# Patient Record
Sex: MALE | Race: WHITE | NOT HISPANIC OR LATINO | Employment: FULL TIME | URBAN - METROPOLITAN AREA
[De-identification: names, ages, dates, MRNs, and addresses within clinical notes are randomized per-mention and may not be internally consistent; named-entity substitution may affect disease eponyms.]

---

## 2023-08-23 PROBLEM — D68.51 FACTOR V LEIDEN (MULTI): Status: ACTIVE | Noted: 2023-08-23

## 2023-08-23 PROBLEM — I10 ESSENTIAL HYPERTENSION: Status: ACTIVE | Noted: 2023-08-23

## 2023-08-23 PROBLEM — K21.9 GASTROESOPHAGEAL REFLUX DISEASE WITHOUT ESOPHAGITIS: Status: ACTIVE | Noted: 2023-08-23

## 2023-08-23 PROBLEM — E78.2 MIXED HYPERLIPIDEMIA: Status: ACTIVE | Noted: 2023-08-23

## 2023-08-23 PROBLEM — R73.03 PREDIABETES: Status: ACTIVE | Noted: 2023-08-23

## 2023-08-23 PROBLEM — M50.90 CERVICAL DISC DISEASE: Status: ACTIVE | Noted: 2023-08-23

## 2023-08-23 RX ORDER — FLUTICASONE PROPIONATE 50 MCG
1 SPRAY, SUSPENSION (ML) NASAL DAILY PRN
COMMUNITY
Start: 2014-10-30 | End: 2023-10-13 | Stop reason: SDUPTHER

## 2023-08-23 RX ORDER — WARFARIN SODIUM 5 MG/1
TABLET ORAL
COMMUNITY
Start: 2014-11-28 | End: 2023-10-13 | Stop reason: SDUPTHER

## 2023-08-23 RX ORDER — PSEUDOEPHEDRINE HCL 30 MG
2 TABLET ORAL EVERY 6 HOURS PRN
COMMUNITY
End: 2023-10-13 | Stop reason: SDUPTHER

## 2023-08-23 RX ORDER — ROSUVASTATIN CALCIUM 5 MG/1
1 TABLET, COATED ORAL DAILY
COMMUNITY
Start: 2022-08-29 | End: 2023-10-09

## 2023-08-23 RX ORDER — SUMATRIPTAN 50 MG/1
1 TABLET, FILM COATED ORAL 2 TIMES DAILY PRN
COMMUNITY
Start: 2014-01-02 | End: 2023-10-13 | Stop reason: SDUPTHER

## 2023-10-07 ENCOUNTER — LAB (OUTPATIENT)
Dept: LAB | Facility: LAB | Age: 63
End: 2023-10-07
Payer: COMMERCIAL

## 2023-10-07 DIAGNOSIS — E78.2 MIXED HYPERLIPIDEMIA: ICD-10-CM

## 2023-10-07 DIAGNOSIS — Z12.5 ENCOUNTER FOR SCREENING FOR MALIGNANT NEOPLASM OF PROSTATE: ICD-10-CM

## 2023-10-07 DIAGNOSIS — R73.03 PREDIABETES: ICD-10-CM

## 2023-10-07 DIAGNOSIS — E55.9 VITAMIN D DEFICIENCY, UNSPECIFIED: ICD-10-CM

## 2023-10-07 DIAGNOSIS — Z01.89 ENCOUNTER FOR OTHER SPECIFIED SPECIAL EXAMINATIONS: Primary | ICD-10-CM

## 2023-10-07 LAB
25(OH)D3 SERPL-MCNC: 30 NG/ML (ref 31–100)
ALBUMIN SERPL-MCNC: 4.1 G/DL (ref 3.5–5)
ALP BLD-CCNC: 64 U/L (ref 35–125)
ALT SERPL-CCNC: 22 U/L (ref 5–40)
ANION GAP SERPL CALC-SCNC: 11 MMOL/L
AST SERPL-CCNC: 18 U/L (ref 5–40)
BASOPHILS # BLD AUTO: 0.05 X10*3/UL (ref 0–0.1)
BASOPHILS NFR BLD AUTO: 0.6 %
BILIRUB DIRECT SERPL-MCNC: <0.2 MG/DL (ref 0–0.2)
BILIRUB SERPL-MCNC: 0.7 MG/DL (ref 0.1–1.2)
BUN SERPL-MCNC: 22 MG/DL (ref 8–25)
CALCIUM SERPL-MCNC: 9.3 MG/DL (ref 8.5–10.4)
CHLORIDE SERPL-SCNC: 106 MMOL/L (ref 97–107)
CHOLEST SERPL-MCNC: 134 MG/DL (ref 133–200)
CHOLEST/HDLC SERPL: 2.6 {RATIO}
CO2 SERPL-SCNC: 28 MMOL/L (ref 24–31)
CREAT SERPL-MCNC: 1.2 MG/DL (ref 0.4–1.6)
EOSINOPHIL # BLD AUTO: 0.25 X10*3/UL (ref 0–0.7)
EOSINOPHIL NFR BLD AUTO: 3.1 %
ERYTHROCYTE [DISTWIDTH] IN BLOOD BY AUTOMATED COUNT: 12.7 % (ref 11.5–14.5)
EST. AVERAGE GLUCOSE BLD GHB EST-MCNC: 128 MG/DL
GFR SERPL CREATININE-BSD FRML MDRD: 67 ML/MIN/1.73M*2
GLUCOSE SERPL-MCNC: 123 MG/DL (ref 65–99)
HBA1C MFR BLD: 6.1 %
HCT VFR BLD AUTO: 50.7 % (ref 41–52)
HDLC SERPL-MCNC: 51 MG/DL
HGB BLD-MCNC: 16.8 G/DL (ref 13.5–17.5)
IMM GRANULOCYTES # BLD AUTO: 0.02 X10*3/UL (ref 0–0.7)
IMM GRANULOCYTES NFR BLD AUTO: 0.3 % (ref 0–0.9)
LDLC SERPL CALC-MCNC: 62 MG/DL (ref 65–130)
LYMPHOCYTES # BLD AUTO: 1.34 X10*3/UL (ref 1.2–4.8)
LYMPHOCYTES NFR BLD AUTO: 16.8 %
MCH RBC QN AUTO: 30.1 PG (ref 26–34)
MCHC RBC AUTO-ENTMCNC: 33.1 G/DL (ref 32–36)
MCV RBC AUTO: 91 FL (ref 80–100)
MONOCYTES # BLD AUTO: 0.69 X10*3/UL (ref 0.1–1)
MONOCYTES NFR BLD AUTO: 8.6 %
NEUTROPHILS # BLD AUTO: 5.63 X10*3/UL (ref 1.2–7.7)
NEUTROPHILS NFR BLD AUTO: 70.6 %
NRBC BLD-RTO: 0 /100 WBCS (ref 0–0)
PLATELET # BLD AUTO: 205 X10*3/UL (ref 150–450)
PMV BLD AUTO: 10.6 FL (ref 7.5–11.5)
POTASSIUM SERPL-SCNC: 5.5 MMOL/L (ref 3.4–5.1)
PROT SERPL-MCNC: 6.8 G/DL (ref 5.9–7.9)
PSA SERPL-MCNC: 1.9 NG/ML
RBC # BLD AUTO: 5.59 X10*6/UL (ref 4.5–5.9)
SODIUM SERPL-SCNC: 145 MMOL/L (ref 133–145)
TRIGL SERPL-MCNC: 105 MG/DL (ref 40–150)
WBC # BLD AUTO: 8 X10*3/UL (ref 4.4–11.3)

## 2023-10-07 PROCEDURE — 82306 VITAMIN D 25 HYDROXY: CPT

## 2023-10-07 PROCEDURE — 36415 COLL VENOUS BLD VENIPUNCTURE: CPT

## 2023-10-07 PROCEDURE — 80053 COMPREHEN METABOLIC PANEL: CPT

## 2023-10-07 PROCEDURE — 84153 ASSAY OF PSA TOTAL: CPT

## 2023-10-07 PROCEDURE — 82248 BILIRUBIN DIRECT: CPT

## 2023-10-07 PROCEDURE — 85025 COMPLETE CBC W/AUTO DIFF WBC: CPT

## 2023-10-07 PROCEDURE — 80061 LIPID PANEL: CPT

## 2023-10-07 PROCEDURE — 83036 HEMOGLOBIN GLYCOSYLATED A1C: CPT

## 2023-10-09 DIAGNOSIS — E78.2 MIXED HYPERLIPIDEMIA: ICD-10-CM

## 2023-10-09 DIAGNOSIS — D68.51 FACTOR V LEIDEN (MULTI): Primary | ICD-10-CM

## 2023-10-09 RX ORDER — ROSUVASTATIN CALCIUM 5 MG/1
5 TABLET, COATED ORAL DAILY
Qty: 90 TABLET | Refills: 3 | Status: SHIPPED | OUTPATIENT
Start: 2023-10-09 | End: 2023-10-13 | Stop reason: SDUPTHER

## 2023-10-11 DIAGNOSIS — E87.5 HYPERKALEMIA: Primary | ICD-10-CM

## 2023-10-13 ENCOUNTER — OFFICE VISIT (OUTPATIENT)
Dept: PRIMARY CARE | Facility: CLINIC | Age: 63
End: 2023-10-13
Payer: COMMERCIAL

## 2023-10-13 VITALS
WEIGHT: 208 LBS | HEART RATE: 77 BPM | TEMPERATURE: 98 F | DIASTOLIC BLOOD PRESSURE: 80 MMHG | OXYGEN SATURATION: 95 % | BODY MASS INDEX: 29.01 KG/M2 | SYSTOLIC BLOOD PRESSURE: 138 MMHG

## 2023-10-13 DIAGNOSIS — D68.51 FACTOR V LEIDEN (MULTI): ICD-10-CM

## 2023-10-13 DIAGNOSIS — I10 ESSENTIAL HYPERTENSION: Primary | ICD-10-CM

## 2023-10-13 DIAGNOSIS — Z12.5 ENCOUNTER FOR PROSTATE CANCER SCREENING: ICD-10-CM

## 2023-10-13 DIAGNOSIS — E78.2 MIXED HYPERLIPIDEMIA: ICD-10-CM

## 2023-10-13 DIAGNOSIS — J30.2 SEASONAL ALLERGIES: ICD-10-CM

## 2023-10-13 DIAGNOSIS — R73.03 PREDIABETES: ICD-10-CM

## 2023-10-13 DIAGNOSIS — G43.909 MIGRAINE SYNDROME: ICD-10-CM

## 2023-10-13 DIAGNOSIS — Z23 ENCOUNTER FOR IMMUNIZATION: ICD-10-CM

## 2023-10-13 DIAGNOSIS — Z79.01 LONG TERM CURRENT USE OF ANTICOAGULANT: ICD-10-CM

## 2023-10-13 DIAGNOSIS — E55.9 VITAMIN D DEFICIENCY: ICD-10-CM

## 2023-10-13 DIAGNOSIS — E87.5 HYPERKALEMIA: ICD-10-CM

## 2023-10-13 DIAGNOSIS — Z01.89 ENCOUNTER FOR ROUTINE LABORATORY TESTING: ICD-10-CM

## 2023-10-13 PROCEDURE — 1159F MED LIST DOCD IN RCRD: CPT | Performed by: STUDENT IN AN ORGANIZED HEALTH CARE EDUCATION/TRAINING PROGRAM

## 2023-10-13 PROCEDURE — 1126F AMNT PAIN NOTED NONE PRSNT: CPT | Performed by: STUDENT IN AN ORGANIZED HEALTH CARE EDUCATION/TRAINING PROGRAM

## 2023-10-13 PROCEDURE — 1160F RVW MEDS BY RX/DR IN RCRD: CPT | Performed by: STUDENT IN AN ORGANIZED HEALTH CARE EDUCATION/TRAINING PROGRAM

## 2023-10-13 PROCEDURE — 3075F SYST BP GE 130 - 139MM HG: CPT | Performed by: STUDENT IN AN ORGANIZED HEALTH CARE EDUCATION/TRAINING PROGRAM

## 2023-10-13 PROCEDURE — 99214 OFFICE O/P EST MOD 30 MIN: CPT | Performed by: STUDENT IN AN ORGANIZED HEALTH CARE EDUCATION/TRAINING PROGRAM

## 2023-10-13 PROCEDURE — 3079F DIAST BP 80-89 MM HG: CPT | Performed by: STUDENT IN AN ORGANIZED HEALTH CARE EDUCATION/TRAINING PROGRAM

## 2023-10-13 PROCEDURE — 1036F TOBACCO NON-USER: CPT | Performed by: STUDENT IN AN ORGANIZED HEALTH CARE EDUCATION/TRAINING PROGRAM

## 2023-10-13 PROCEDURE — 90662 IIV NO PRSV INCREASED AG IM: CPT | Performed by: STUDENT IN AN ORGANIZED HEALTH CARE EDUCATION/TRAINING PROGRAM

## 2023-10-13 RX ORDER — SUMATRIPTAN 50 MG/1
50 TABLET, FILM COATED ORAL 2 TIMES DAILY PRN
Qty: 9 TABLET | Refills: 1 | Status: SHIPPED | OUTPATIENT
Start: 2023-10-13 | End: 2023-12-12 | Stop reason: SDUPTHER

## 2023-10-13 RX ORDER — FLUTICASONE PROPIONATE 50 MCG
1 SPRAY, SUSPENSION (ML) NASAL DAILY PRN
Qty: 16 G
Start: 2023-10-13 | End: 2024-04-12 | Stop reason: ALTCHOICE

## 2023-10-13 RX ORDER — WARFARIN SODIUM 5 MG/1
TABLET ORAL
Start: 2023-10-13 | End: 2024-04-12 | Stop reason: SDUPTHER

## 2023-10-13 RX ORDER — ROSUVASTATIN CALCIUM 5 MG/1
5 TABLET, COATED ORAL DAILY
Qty: 90 TABLET | Refills: 3 | Status: SHIPPED | OUTPATIENT
Start: 2023-10-13 | End: 2023-12-05

## 2023-10-13 RX ORDER — MINERAL OIL
180 ENEMA (ML) RECTAL DAILY
Qty: 90 TABLET | Refills: 1 | Status: SHIPPED | OUTPATIENT
Start: 2023-10-13 | End: 2024-04-12 | Stop reason: SDUPTHER

## 2023-10-13 RX ORDER — SUMATRIPTAN 50 MG/1
50 TABLET, FILM COATED ORAL 2 TIMES DAILY PRN
Qty: 9 TABLET
Start: 2023-10-13 | End: 2023-10-13 | Stop reason: SDUPTHER

## 2023-10-13 RX ORDER — PSEUDOEPHEDRINE HCL 30 MG
60 TABLET ORAL EVERY 6 HOURS PRN
Qty: 30 TABLET
Start: 2023-10-13

## 2023-10-13 RX ORDER — VIT C/E/ZN/COPPR/LUTEIN/ZEAXAN 250MG-90MG
25 CAPSULE ORAL DAILY
Qty: 30 CAPSULE | Refills: 11
Start: 2023-10-13 | End: 2024-04-12 | Stop reason: SDUPTHER

## 2023-10-13 ASSESSMENT — ENCOUNTER SYMPTOMS
RESPIRATORY NEGATIVE: 1
CARDIOVASCULAR NEGATIVE: 1
CONSTITUTIONAL NEGATIVE: 1
GASTROINTESTINAL NEGATIVE: 1

## 2023-10-13 ASSESSMENT — PAIN SCALES - GENERAL: PAINLEVEL: 0-NO PAIN

## 2023-10-13 NOTE — PATIENT INSTRUCTIONS
Patient to double-check insurance formulation for his propranolol since I got a notification that this medication might not be preferred on his insurance.  Diagnoses and all orders for this visit:  Essential hypertension  Comments:  Acceptable. No changes at this time.  Orders:  -     propranolol XL (Innopran XL) 120 mg 24 hr capsule; Take 1 capsule (120 mg) by mouth once daily.  -     Basic Metabolic Panel; Future  -     CBC; Future  Prediabetes  Comments:  Stable. No changes at this time.  Orders:  -     Hemoglobin A1C; Future  Mixed hyperlipidemia  Comments:  Stable. No changes at this time.  Orders:  -     rosuvastatin (Crestor) 5 mg tablet; Take 1 tablet (5 mg) by mouth once daily.  -     Hepatic Function Panel; Future  -     Lipid Panel; Future  Factor V Leiden (CMS/HCC)  -     warfarin (Jantoven) 5 mg tablet; 1 tablet one day of the week, 0.5 tablet six days a week  Long term current use of anticoagulant  -     warfarin (Jantoven) 5 mg tablet; 1 tablet one day of the week, 0.5 tablet six days a week  Vitamin D deficiency  -     cholecalciferol (Vitamin D3) 25 MCG (1000 UT) capsule; Take 1 capsule (25 mcg) by mouth once daily.  -     Vitamin D 25-Hydroxy,Total (for eval of Vitamin D levels); Future  Migraine syndrome  -     SUMAtriptan (Imitrex) 50 mg tablet; Take 1 tablet (50 mg) by mouth 2 times a day as needed for migraine.  Seasonal allergies  Comments:  Claritin and Zyrtec alongside flonase not providing sufficient relief.  Trial allegra. Sent today.  Orders:  -     fluticasone (Flonase Allergy Relief) 50 mcg/actuation nasal spray; Administer 1 spray into each nostril once daily as needed for rhinitis or allergies.  -     pseudoephedrine (Sudafed) 30 mg tablet; Take 2 tablets (60 mg) by mouth every 6 hours if needed for congestion.  -     fexofenadine (Allegra) 180 mg tablet; Take 1 tablet (180 mg) by mouth once daily.  Encounter for immunization  -     Flu vaccine, quadrivalent, high-dose, preservative  free, age 65y+ (FLUZONE)  Hyperkalemia  Comments:  BMP ordered for next week.  Encounter for routine laboratory testing  -     Basic Metabolic Panel; Future  -     Prostate Specific Antigen; Future  -     Hepatic Function Panel; Future  -     Lipid Panel; Future  -     CBC; Future  -     Hemoglobin A1C; Future  -     Vitamin D 25-Hydroxy,Total (for eval of Vitamin D levels); Future  Encounter for prostate cancer screening  -     Prostate Specific Antigen; Future

## 2023-10-13 NOTE — PROGRESS NOTES
[facility]: [facility]  PROGRESS NOTE      Kaiden Velasquez is a 63 y.o. male that is presenting today for Follow-up (6 month follow up ).    Assessment/Plan   Diagnoses and all orders for this visit:  Essential hypertension  Comments:  Acceptable. No changes at this time.  Orders:  -     propranolol XL (Innopran XL) 120 mg 24 hr capsule; Take 1 capsule (120 mg) by mouth once daily.  -     Basic Metabolic Panel; Future  -     CBC; Future  Prediabetes  Comments:  Stable. No changes at this time.  Orders:  -     Hemoglobin A1C; Future  Mixed hyperlipidemia  Comments:  Stable. No changes at this time.  Orders:  -     rosuvastatin (Crestor) 5 mg tablet; Take 1 tablet (5 mg) by mouth once daily.  -     Hepatic Function Panel; Future  -     Lipid Panel; Future  Factor V Leiden (CMS/HCC)  -     warfarin (Jantoven) 5 mg tablet; 1 tablet one day of the week, 0.5 tablet six days a week  Long term current use of anticoagulant  -     warfarin (Jantoven) 5 mg tablet; 1 tablet one day of the week, 0.5 tablet six days a week  Vitamin D deficiency  -     cholecalciferol (Vitamin D3) 25 MCG (1000 UT) capsule; Take 1 capsule (25 mcg) by mouth once daily.  -     Vitamin D 25-Hydroxy,Total (for eval of Vitamin D levels); Future  Migraine syndrome  -     SUMAtriptan (Imitrex) 50 mg tablet; Take 1 tablet (50 mg) by mouth 2 times a day as needed for migraine.  Seasonal allergies  Comments:  Claritin and Zyrtec alongside flonase not providing sufficient relief.  Trial allegra. Sent today.  Orders:  -     fluticasone (Flonase Allergy Relief) 50 mcg/actuation nasal spray; Administer 1 spray into each nostril once daily as needed for rhinitis or allergies.  -     pseudoephedrine (Sudafed) 30 mg tablet; Take 2 tablets (60 mg) by mouth every 6 hours if needed for congestion.  -     fexofenadine (Allegra) 180 mg tablet; Take 1 tablet (180 mg) by mouth once daily.  Encounter for immunization  -     Flu vaccine,  quadrivalent, high-dose, preservative free, age 65y+ (FLUZONE)  Hyperkalemia  Comments:  BMP ordered for next week.  Encounter for routine laboratory testing  -     Basic Metabolic Panel; Future  -     Prostate Specific Antigen; Future  -     Hepatic Function Panel; Future  -     Lipid Panel; Future  -     CBC; Future  -     Hemoglobin A1C; Future  -     Vitamin D 25-Hydroxy,Total (for eval of Vitamin D levels); Future  Encounter for prostate cancer screening  -     Prostate Specific Antigen; Future    Subjective   - The patient feels well and denies any acute symptoms or concerns at this time.   - The patient denies any changes or progression of their chronic medical problems.  - The patient denies any problems or concerns with their medications.      Review of Systems   Constitutional: Negative.    Respiratory: Negative.     Cardiovascular: Negative.    Gastrointestinal: Negative.       Objective   Vitals:    10/13/23 1435   BP: 138/80   Pulse: 77   Temp: 36.7 °C (98 °F)   SpO2: 95%      Body mass index is 29.01 kg/m².  Physical Exam  Constitutional:       General: He is not in acute distress.  Neck:      Vascular: No carotid bruit.   Cardiovascular:      Rate and Rhythm: Normal rate and regular rhythm.      Heart sounds: Normal heart sounds.   Pulmonary:      Effort: Pulmonary effort is normal.      Breath sounds: Normal breath sounds.   Musculoskeletal:         General: No swelling.   Neurological:      Mental Status: He is alert. Mental status is at baseline.   Psychiatric:         Mood and Affect: Mood normal.       Diagnostic Results   Lab Results   Component Value Date    GLUCOSE 123 (H) 10/07/2023    CALCIUM 9.3 10/07/2023     10/07/2023    K 5.5 (H) 10/07/2023    CO2 28 10/07/2023     10/07/2023    BUN 22 10/07/2023    CREATININE 1.20 10/07/2023     Lab Results   Component Value Date    ALT 22 10/07/2023    AST 18 10/07/2023    ALKPHOS 64 10/07/2023    BILITOT 0.7 10/07/2023     Lab Results  "  Component Value Date    WBC 8.0 10/07/2023    HGB 16.8 10/07/2023    HCT 50.7 10/07/2023    MCV 91 10/07/2023     10/07/2023     Lab Results   Component Value Date    CHOL 134 10/07/2023    CHOL 189 08/26/2022    CHOL 183 04/02/2022     Lab Results   Component Value Date    HDL 51.0 10/07/2023    HDL 44 08/26/2022    HDL 45 04/02/2022     Lab Results   Component Value Date    LDLCALC 62 (L) 10/07/2023    LDLCALC 118 08/26/2022    LDLCALC 119 04/02/2022     Lab Results   Component Value Date    TRIG 105 10/07/2023    TRIG 135 08/26/2022    TRIG 97 04/02/2022     No components found for: \"CHOLHDL\"  Lab Results   Component Value Date    HGBA1C 6.1 (H) 10/07/2023     Other labs not included in the list above were reviewed either before or during this encounter.    History    No past medical history on file.  No past surgical history on file.  Family History   Problem Relation Name Age of Onset    Arthritis Mother      Gout Mother      Diabetes Mother      Heart disease Father      Hypertension Father      Prostate cancer Father      Breast cancer Sister      Cancer Brother      No Known Problems Maternal Grandfather      Colon cancer Other      Hypertension Other       Social History     Socioeconomic History    Marital status:      Spouse name: Not on file    Number of children: Not on file    Years of education: Not on file    Highest education level: Not on file   Occupational History    Not on file   Tobacco Use    Smoking status: Never    Smokeless tobacco: Never   Substance and Sexual Activity    Alcohol use: Yes     Comment: occasionally    Drug use: Never    Sexual activity: Not on file   Other Topics Concern    Not on file   Social History Narrative    Not on file     Social Determinants of Health     Financial Resource Strain: Not on file   Food Insecurity: Not on file   Transportation Needs: Not on file   Physical Activity: Not on file   Stress: Not on file   Social Connections: Not on file "   Intimate Partner Violence: Not on file   Housing Stability: Not on file     No Known Allergies  Current Outpatient Medications on File Prior to Visit   Medication Sig Dispense Refill    [DISCONTINUED] fluticasone (Flonase Allergy Relief) 50 mcg/actuation nasal spray Administer 1 spray into each nostril once daily as needed.      [DISCONTINUED] propranolol XL (Innopran XL) 120 mg 24 hr capsule Take 1 capsule (120 mg) by mouth once daily.      [DISCONTINUED] pseudoephedrine (Sudafed) 30 mg tablet Take 2 tablets (60 mg) by mouth every 6 hours if needed.      [DISCONTINUED] rosuvastatin (Crestor) 5 mg tablet TAKE ONE TABLET BY MOUTH EVERY DAY 90 tablet 3    [DISCONTINUED] SUMAtriptan (Imitrex) 50 mg tablet Take 1 tablet (50 mg) by mouth 2 times a day as needed.      [DISCONTINUED] warfarin (Jantoven) 5 mg tablet Take by mouth. 1 tablet one day of the week, 0.5 tablet six days a week      [DISCONTINUED] rosuvastatin (Crestor) 5 mg tablet Take 1 tablet (5 mg) by mouth once daily.       No current facility-administered medications on file prior to visit.     Immunization History   Administered Date(s) Administered    Flu vaccine (IIV4), preservative free *Check age/dose* 10/21/2020, 11/10/2022    Flu vaccine, quadrivalent, recombinant, preservative free, adult (FLUBLOK) 10/25/2021    Influenza, injectable, quadrivalent 10/06/2015    Influenza, seasonal, injectable 11/29/2012    Pfizer Purple Cap SARS-CoV-2 03/31/2021, 04/24/2021    Pneumococcal conjugate vaccine, 20-valent (PREVNAR 20) 04/03/2023     Patient's medical history was reviewed and updated either before or during this encounter.    Florin Worley MD

## 2023-10-28 ENCOUNTER — LAB (OUTPATIENT)
Dept: LAB | Facility: LAB | Age: 63
End: 2023-10-28
Payer: COMMERCIAL

## 2023-10-28 DIAGNOSIS — D68.51 FACTOR V LEIDEN (MULTI): ICD-10-CM

## 2023-10-28 DIAGNOSIS — E87.5 HYPERKALEMIA: ICD-10-CM

## 2023-10-28 LAB
ANION GAP SERPL CALC-SCNC: 10 MMOL/L
BUN SERPL-MCNC: 26 MG/DL (ref 8–25)
CALCIUM SERPL-MCNC: 9.1 MG/DL (ref 8.5–10.4)
CHLORIDE SERPL-SCNC: 107 MMOL/L (ref 97–107)
CO2 SERPL-SCNC: 27 MMOL/L (ref 24–31)
CREAT SERPL-MCNC: 1.4 MG/DL (ref 0.4–1.6)
GFR SERPL CREATININE-BSD FRML MDRD: 56 ML/MIN/1.73M*2
GLUCOSE SERPL-MCNC: 136 MG/DL (ref 65–99)
INR PPP: 2.2 (ref 0.9–1.2)
POTASSIUM SERPL-SCNC: 4.8 MMOL/L (ref 3.4–5.1)
PROTHROMBIN TIME: 22 SECONDS (ref 9.3–12.7)
SODIUM SERPL-SCNC: 144 MMOL/L (ref 133–145)

## 2023-10-28 PROCEDURE — 85610 PROTHROMBIN TIME: CPT

## 2023-10-28 PROCEDURE — 80048 BASIC METABOLIC PNL TOTAL CA: CPT

## 2023-10-28 PROCEDURE — 36415 COLL VENOUS BLD VENIPUNCTURE: CPT

## 2023-11-28 ENCOUNTER — TELEPHONE (OUTPATIENT)
Dept: PRIMARY CARE | Facility: CLINIC | Age: 63
End: 2023-11-28
Payer: COMMERCIAL

## 2023-11-28 DIAGNOSIS — U07.1 COVID-19: Primary | ICD-10-CM

## 2023-11-28 NOTE — TELEPHONE ENCOUNTER
Pt c/o sinus congestion, sore throat, cough since last Friday. Positive COVID test this morning. Taking dayquil, ibuprofen OTC. Please advise.

## 2023-12-04 DIAGNOSIS — E78.2 MIXED HYPERLIPIDEMIA: ICD-10-CM

## 2023-12-05 RX ORDER — ROSUVASTATIN CALCIUM 5 MG/1
5 TABLET, COATED ORAL DAILY
Qty: 90 TABLET | Refills: 3 | Status: SHIPPED | OUTPATIENT
Start: 2023-12-05 | End: 2024-04-12 | Stop reason: SDUPTHER

## 2023-12-05 NOTE — TELEPHONE ENCOUNTER
LV 10/13/23, NV 4/12/24.      This med sent to [facility] 10/13/23 for 90 days, 3 refills.  Spoke to pt who indicates more cost effective to use mail order.  Please fill.

## 2023-12-12 DIAGNOSIS — G43.909 MIGRAINE SYNDROME: ICD-10-CM

## 2023-12-12 RX ORDER — SUMATRIPTAN 50 MG/1
50 TABLET, FILM COATED ORAL 2 TIMES DAILY PRN
Qty: 9 TABLET | Refills: 3 | Status: SHIPPED | OUTPATIENT
Start: 2023-12-12 | End: 2024-04-12 | Stop reason: SDUPTHER

## 2024-02-05 DIAGNOSIS — I10 ESSENTIAL HYPERTENSION: ICD-10-CM

## 2024-04-06 ENCOUNTER — LAB (OUTPATIENT)
Dept: LAB | Facility: LAB | Age: 64
End: 2024-04-06
Payer: COMMERCIAL

## 2024-04-06 DIAGNOSIS — R73.03 PREDIABETES: ICD-10-CM

## 2024-04-06 DIAGNOSIS — E55.9 VITAMIN D DEFICIENCY: ICD-10-CM

## 2024-04-06 DIAGNOSIS — I10 ESSENTIAL HYPERTENSION: ICD-10-CM

## 2024-04-06 DIAGNOSIS — Z01.89 ENCOUNTER FOR ROUTINE LABORATORY TESTING: ICD-10-CM

## 2024-04-06 DIAGNOSIS — E78.2 MIXED HYPERLIPIDEMIA: ICD-10-CM

## 2024-04-06 DIAGNOSIS — Z12.5 ENCOUNTER FOR PROSTATE CANCER SCREENING: ICD-10-CM

## 2024-04-06 DIAGNOSIS — D68.51 FACTOR V LEIDEN (MULTI): ICD-10-CM

## 2024-04-06 LAB
25(OH)D3 SERPL-MCNC: 23 NG/ML (ref 31–100)
ALBUMIN SERPL-MCNC: 3.9 G/DL (ref 3.5–5)
ALP BLD-CCNC: 59 U/L (ref 35–125)
ALT SERPL-CCNC: 23 U/L (ref 5–40)
ANION GAP SERPL CALC-SCNC: 12 MMOL/L
AST SERPL-CCNC: 18 U/L (ref 5–40)
BILIRUB DIRECT SERPL-MCNC: <0.2 MG/DL (ref 0–0.2)
BILIRUB SERPL-MCNC: 0.6 MG/DL (ref 0.1–1.2)
BUN SERPL-MCNC: 25 MG/DL (ref 8–25)
CALCIUM SERPL-MCNC: 9 MG/DL (ref 8.5–10.4)
CHLORIDE SERPL-SCNC: 106 MMOL/L (ref 97–107)
CHOLEST SERPL-MCNC: 139 MG/DL (ref 133–200)
CHOLEST/HDLC SERPL: 3.3 {RATIO}
CO2 SERPL-SCNC: 26 MMOL/L (ref 24–31)
CREAT SERPL-MCNC: 1.4 MG/DL (ref 0.4–1.6)
EGFRCR SERPLBLD CKD-EPI 2021: 55 ML/MIN/1.73M*2
ERYTHROCYTE [DISTWIDTH] IN BLOOD BY AUTOMATED COUNT: 12.9 % (ref 11.5–14.5)
EST. AVERAGE GLUCOSE BLD GHB EST-MCNC: 143 MG/DL
GLUCOSE SERPL-MCNC: 188 MG/DL (ref 65–99)
HBA1C MFR BLD: 6.6 %
HCT VFR BLD AUTO: 48.7 % (ref 41–52)
HDLC SERPL-MCNC: 42 MG/DL
HGB BLD-MCNC: 16.1 G/DL (ref 13.5–17.5)
INR PPP: 1.9 (ref 0.9–1.2)
LDLC SERPL CALC-MCNC: 72 MG/DL (ref 65–130)
MCH RBC QN AUTO: 30.1 PG (ref 26–34)
MCHC RBC AUTO-ENTMCNC: 33.1 G/DL (ref 32–36)
MCV RBC AUTO: 91 FL (ref 80–100)
NRBC BLD-RTO: 0 /100 WBCS (ref 0–0)
PLATELET # BLD AUTO: 176 X10*3/UL (ref 150–450)
POTASSIUM SERPL-SCNC: 5.1 MMOL/L (ref 3.4–5.1)
PROT SERPL-MCNC: 6.1 G/DL (ref 5.9–7.9)
PROTHROMBIN TIME: 19.1 SECONDS (ref 9.3–12.7)
PSA SERPL-MCNC: 2.2 NG/ML
RBC # BLD AUTO: 5.35 X10*6/UL (ref 4.5–5.9)
SODIUM SERPL-SCNC: 144 MMOL/L (ref 133–145)
TRIGL SERPL-MCNC: 123 MG/DL (ref 40–150)
WBC # BLD AUTO: 6.3 X10*3/UL (ref 4.4–11.3)

## 2024-04-06 PROCEDURE — 80061 LIPID PANEL: CPT

## 2024-04-06 PROCEDURE — 82248 BILIRUBIN DIRECT: CPT

## 2024-04-06 PROCEDURE — 82306 VITAMIN D 25 HYDROXY: CPT

## 2024-04-06 PROCEDURE — 83036 HEMOGLOBIN GLYCOSYLATED A1C: CPT

## 2024-04-06 PROCEDURE — 36415 COLL VENOUS BLD VENIPUNCTURE: CPT

## 2024-04-06 PROCEDURE — 85027 COMPLETE CBC AUTOMATED: CPT

## 2024-04-06 PROCEDURE — 85610 PROTHROMBIN TIME: CPT

## 2024-04-06 PROCEDURE — 80053 COMPREHEN METABOLIC PANEL: CPT

## 2024-04-06 PROCEDURE — 84153 ASSAY OF PSA TOTAL: CPT

## 2024-04-12 ENCOUNTER — OFFICE VISIT (OUTPATIENT)
Dept: PRIMARY CARE | Facility: CLINIC | Age: 64
End: 2024-04-12
Payer: COMMERCIAL

## 2024-04-12 VITALS
SYSTOLIC BLOOD PRESSURE: 130 MMHG | DIASTOLIC BLOOD PRESSURE: 80 MMHG | BODY MASS INDEX: 29.96 KG/M2 | HEIGHT: 71 IN | HEART RATE: 55 BPM | OXYGEN SATURATION: 98 % | TEMPERATURE: 97 F | WEIGHT: 214 LBS

## 2024-04-12 DIAGNOSIS — Z12.12 ENCOUNTER FOR COLORECTAL CANCER SCREENING: ICD-10-CM

## 2024-04-12 DIAGNOSIS — D68.51 FACTOR V LEIDEN (MULTI): ICD-10-CM

## 2024-04-12 DIAGNOSIS — Z00.00 ANNUAL PHYSICAL EXAM: Primary | ICD-10-CM

## 2024-04-12 DIAGNOSIS — Z12.11 ENCOUNTER FOR COLORECTAL CANCER SCREENING: ICD-10-CM

## 2024-04-12 DIAGNOSIS — E11.9 TYPE 2 DIABETES MELLITUS WITHOUT COMPLICATION, WITHOUT LONG-TERM CURRENT USE OF INSULIN (MULTI): ICD-10-CM

## 2024-04-12 DIAGNOSIS — G43.909 MIGRAINE SYNDROME: ICD-10-CM

## 2024-04-12 DIAGNOSIS — E78.2 MIXED HYPERLIPIDEMIA: ICD-10-CM

## 2024-04-12 DIAGNOSIS — Z12.5 ENCOUNTER FOR PROSTATE CANCER SCREENING: ICD-10-CM

## 2024-04-12 DIAGNOSIS — Z01.89 ENCOUNTER FOR ROUTINE LABORATORY TESTING: ICD-10-CM

## 2024-04-12 DIAGNOSIS — I10 PRIMARY HYPERTENSION: ICD-10-CM

## 2024-04-12 DIAGNOSIS — N18.31 STAGE 3A CHRONIC KIDNEY DISEASE (MULTI): ICD-10-CM

## 2024-04-12 DIAGNOSIS — E55.9 VITAMIN D DEFICIENCY: ICD-10-CM

## 2024-04-12 DIAGNOSIS — J30.2 SEASONAL ALLERGIES: ICD-10-CM

## 2024-04-12 DIAGNOSIS — K21.9 GASTROESOPHAGEAL REFLUX DISEASE WITHOUT ESOPHAGITIS: ICD-10-CM

## 2024-04-12 DIAGNOSIS — Z23 ENCOUNTER FOR IMMUNIZATION: ICD-10-CM

## 2024-04-12 PROBLEM — N18.9 CKD (CHRONIC KIDNEY DISEASE): Status: ACTIVE | Noted: 2024-04-12

## 2024-04-12 PROBLEM — E78.5 HLD (HYPERLIPIDEMIA): Status: ACTIVE | Noted: 2023-08-23

## 2024-04-12 PROBLEM — R73.03 PREDIABETES: Status: RESOLVED | Noted: 2023-08-23 | Resolved: 2024-04-12

## 2024-04-12 PROCEDURE — 3048F LDL-C <100 MG/DL: CPT | Performed by: STUDENT IN AN ORGANIZED HEALTH CARE EDUCATION/TRAINING PROGRAM

## 2024-04-12 PROCEDURE — 1126F AMNT PAIN NOTED NONE PRSNT: CPT | Performed by: STUDENT IN AN ORGANIZED HEALTH CARE EDUCATION/TRAINING PROGRAM

## 2024-04-12 PROCEDURE — 99214 OFFICE O/P EST MOD 30 MIN: CPT | Performed by: STUDENT IN AN ORGANIZED HEALTH CARE EDUCATION/TRAINING PROGRAM

## 2024-04-12 PROCEDURE — 99397 PER PM REEVAL EST PAT 65+ YR: CPT | Performed by: STUDENT IN AN ORGANIZED HEALTH CARE EDUCATION/TRAINING PROGRAM

## 2024-04-12 PROCEDURE — 1036F TOBACCO NON-USER: CPT | Performed by: STUDENT IN AN ORGANIZED HEALTH CARE EDUCATION/TRAINING PROGRAM

## 2024-04-12 PROCEDURE — 3079F DIAST BP 80-89 MM HG: CPT | Performed by: STUDENT IN AN ORGANIZED HEALTH CARE EDUCATION/TRAINING PROGRAM

## 2024-04-12 PROCEDURE — 1160F RVW MEDS BY RX/DR IN RCRD: CPT | Performed by: STUDENT IN AN ORGANIZED HEALTH CARE EDUCATION/TRAINING PROGRAM

## 2024-04-12 PROCEDURE — 3075F SYST BP GE 130 - 139MM HG: CPT | Performed by: STUDENT IN AN ORGANIZED HEALTH CARE EDUCATION/TRAINING PROGRAM

## 2024-04-12 PROCEDURE — 1159F MED LIST DOCD IN RCRD: CPT | Performed by: STUDENT IN AN ORGANIZED HEALTH CARE EDUCATION/TRAINING PROGRAM

## 2024-04-12 PROCEDURE — 3044F HG A1C LEVEL LT 7.0%: CPT | Performed by: STUDENT IN AN ORGANIZED HEALTH CARE EDUCATION/TRAINING PROGRAM

## 2024-04-12 RX ORDER — ROSUVASTATIN CALCIUM 5 MG/1
5 TABLET, COATED ORAL DAILY
Qty: 90 TABLET | Refills: 3 | Status: SHIPPED | OUTPATIENT
Start: 2024-04-12 | End: 2025-04-12

## 2024-04-12 RX ORDER — ACETAMINOPHEN 500 MG
2000 TABLET ORAL DAILY
Start: 2024-04-12

## 2024-04-12 RX ORDER — VIT C/E/ZN/COPPR/LUTEIN/ZEAXAN 250MG-90MG
25 CAPSULE ORAL DAILY
Start: 2024-04-12

## 2024-04-12 RX ORDER — SUMATRIPTAN 50 MG/1
50 TABLET, FILM COATED ORAL 2 TIMES DAILY PRN
Qty: 9 TABLET | Refills: 3 | Status: SHIPPED | OUTPATIENT
Start: 2024-04-12

## 2024-04-12 RX ORDER — MINERAL OIL
180 ENEMA (ML) RECTAL DAILY
Qty: 90 TABLET | Refills: 3 | Status: SHIPPED | OUTPATIENT
Start: 2024-04-12 | End: 2025-04-12

## 2024-04-12 RX ORDER — WARFARIN SODIUM 5 MG/1
TABLET ORAL
Qty: 90 TABLET | Refills: 3 | Status: SHIPPED | OUTPATIENT
Start: 2024-04-12

## 2024-04-12 ASSESSMENT — ENCOUNTER SYMPTOMS
CONSTITUTIONAL NEGATIVE: 1
NEUROLOGICAL NEGATIVE: 1
MUSCULOSKELETAL NEGATIVE: 1
HEMATOLOGIC/LYMPHATIC NEGATIVE: 1
GASTROINTESTINAL NEGATIVE: 1
ENDOCRINE NEGATIVE: 1
PSYCHIATRIC NEGATIVE: 1
EYES NEGATIVE: 1
CARDIOVASCULAR NEGATIVE: 1
RESPIRATORY NEGATIVE: 1
ALLERGIC/IMMUNOLOGIC NEGATIVE: 1

## 2024-04-12 ASSESSMENT — PATIENT HEALTH QUESTIONNAIRE - PHQ9
2. FEELING DOWN, DEPRESSED OR HOPELESS: NOT AT ALL
1. LITTLE INTEREST OR PLEASURE IN DOING THINGS: NOT AT ALL
SUM OF ALL RESPONSES TO PHQ9 QUESTIONS 1 AND 2: 0

## 2024-04-12 ASSESSMENT — PAIN SCALES - GENERAL: PAINLEVEL: 0-NO PAIN

## 2024-04-12 NOTE — PROGRESS NOTES
[facility]: [facility]  PHYSICAL EXAM      Kaiden Velasquez is a 64 y.o. male that is presenting today for Annual Exam (CPE).    Assessment/Plan   Diagnoses and all orders for this visit:  Annual physical exam  Encounter for colorectal cancer screening  Encounter for routine laboratory testing  Encounter for prostate cancer screening  Encounter for immunization  Type 2 diabetes mellitus without complication, without long-term current use of insulin (Multi)  Gastroesophageal reflux disease without esophagitis  Stage 3a chronic kidney disease (Multi)  Factor V Leiden (Multi)  Mixed hyperlipidemia  Primary hypertension  Vitamin D deficiency  Migraine syndrome  Seasonal allergies    Discussed routine and/or preventative care with the patient as outlined below:  - Labwork:   - Patient had labwork done for this appointment. Discussed today.   - New diagnosis of Type 2 Diabetes Mellitus. Counseled the patient on what this means and what needs to be done. Counseled dietary and lifestyle modification.  - Vitamin D remains low. Increase OTC supplementation.  - Will order labwork for the patient to get one week prior to the next appointment.  - Imaging:   - Colorectal Cancer: Patient had a positive cologuard in 2019, followed up with colonoscopy in 2019, and at that time, 5-year follow-up was recommended (2024). Encouraged the patient to reach out to his gastroenterologist.  - Immunizations:   - Encouraged the patient to get their shingles (shingrix) immunizations.  - Encouraged the tetanus (TDAP) booster.   - Discussed seasonal immunizations, including the influenza and COVID-19 immunizations.   - Significant medication and problem list reconciliation done today.  - Patient's vitals look great. Do not need to make changes at this time.    Subjective   - The patient otherwise feels well and denies any acute symptoms or concerns at this time.  - The patient denies any changes or progression of their  chronic medical problems.  - The patient denies any problems or concerns with their medications.      Review of Systems   Constitutional: Negative.    HENT: Negative.     Eyes: Negative.    Respiratory: Negative.     Cardiovascular: Negative.    Gastrointestinal: Negative.    Endocrine: Negative.    Genitourinary: Negative.    Musculoskeletal: Negative.    Skin: Negative.    Allergic/Immunologic: Negative.    Neurological: Negative.    Hematological: Negative.    Psychiatric/Behavioral: Negative.     All other systems reviewed and are negative.     Objective   Vitals:    04/12/24 1533   BP: 130/80   Pulse: 55   Temp: 36.1 °C (97 °F)   SpO2: 98%     Body mass index is 29.85 kg/m².  Physical Exam  Vitals and nursing note reviewed.   Constitutional:       General: He is not in acute distress.     Appearance: Normal appearance. He is not ill-appearing.   HENT:      Head: Normocephalic and atraumatic.      Right Ear: Tympanic membrane, ear canal and external ear normal. There is no impacted cerumen.      Left Ear: Tympanic membrane, ear canal and external ear normal. There is no impacted cerumen.      Nose: Nose normal.      Mouth/Throat:      Mouth: Mucous membranes are moist.      Pharynx: Oropharynx is clear. No oropharyngeal exudate or posterior oropharyngeal erythema.   Eyes:      General: No scleral icterus.        Right eye: No discharge.         Left eye: No discharge.      Extraocular Movements: Extraocular movements intact.      Conjunctiva/sclera: Conjunctivae normal.      Pupils: Pupils are equal, round, and reactive to light.   Neck:      Vascular: No carotid bruit.   Cardiovascular:      Rate and Rhythm: Normal rate and regular rhythm.      Pulses: Normal pulses.      Heart sounds: Normal heart sounds. No murmur heard.     No friction rub. No gallop.   Pulmonary:      Effort: Pulmonary effort is normal. No respiratory distress.      Breath sounds: Normal breath sounds.   Abdominal:      General: Abdomen is  "flat. Bowel sounds are normal.      Palpations: Abdomen is soft.      Tenderness: There is no abdominal tenderness.      Hernia: No hernia is present.   Musculoskeletal:         General: No swelling. Normal range of motion.      Cervical back: Normal range of motion.   Lymphadenopathy:      Cervical: No cervical adenopathy.   Skin:     General: Skin is warm and dry.      Coloration: Skin is not jaundiced.      Findings: No rash.   Neurological:      General: No focal deficit present.      Mental Status: He is alert and oriented to person, place, and time. Mental status is at baseline.   Psychiatric:         Mood and Affect: Mood normal.         Behavior: Behavior normal.       Diagnostic Results   Lab Results   Component Value Date    GLUCOSE 188 (H) 04/06/2024    CALCIUM 9.0 04/06/2024     04/06/2024    K 5.1 04/06/2024    CO2 26 04/06/2024     04/06/2024    BUN 25 04/06/2024    CREATININE 1.40 04/06/2024     Lab Results   Component Value Date    ALT 23 04/06/2024    AST 18 04/06/2024    ALKPHOS 59 04/06/2024    BILITOT 0.6 04/06/2024     Lab Results   Component Value Date    WBC 6.3 04/06/2024    HGB 16.1 04/06/2024    HCT 48.7 04/06/2024    MCV 91 04/06/2024     04/06/2024     Lab Results   Component Value Date    CHOL 139 04/06/2024    CHOL 134 10/07/2023    CHOL 189 08/26/2022     Lab Results   Component Value Date    HDL 42.0 04/06/2024    HDL 51.0 10/07/2023    HDL 44 08/26/2022     Lab Results   Component Value Date    LDLCALC 72 04/06/2024    LDLCALC 62 (L) 10/07/2023    LDLCALC 118 08/26/2022     Lab Results   Component Value Date    TRIG 123 04/06/2024    TRIG 105 10/07/2023    TRIG 135 08/26/2022     No components found for: \"CHOLHDL\"  Lab Results   Component Value Date    HGBA1C 6.6 (H) 04/06/2024     Other labs not included in the list above were reviewed either before or during this encounter.    History   History reviewed. No pertinent past medical history.  History reviewed. No " pertinent surgical history.  Family History   Problem Relation Name Age of Onset    Arthritis Mother      Gout Mother      Diabetes Mother      Heart disease Father      Hypertension Father      Prostate cancer Father      Breast cancer Sister      Cancer Brother      No Known Problems Maternal Grandfather      Colon cancer Other      Hypertension Other       Social History     Socioeconomic History    Marital status:      Spouse name: Not on file    Number of children: Not on file    Years of education: Not on file    Highest education level: Not on file   Occupational History    Not on file   Tobacco Use    Smoking status: Never    Smokeless tobacco: Never   Vaping Use    Vaping status: Never Used   Substance and Sexual Activity    Alcohol use: Yes     Comment: occasionally    Drug use: Never    Sexual activity: Not on file   Other Topics Concern    Not on file   Social History Narrative    Not on file     Social Determinants of Health     Financial Resource Strain: Not on file   Food Insecurity: Not on file   Transportation Needs: Not on file   Physical Activity: Not on file   Stress: Not on file   Social Connections: Not on file   Intimate Partner Violence: Not on file   Housing Stability: Not on file     No Known Allergies  Current Outpatient Medications on File Prior to Visit   Medication Sig Dispense Refill    cholecalciferol (Vitamin D3) 25 MCG (1000 UT) capsule Take 1 capsule (25 mcg) by mouth once daily. 30 capsule 11    fexofenadine (Allegra) 180 mg tablet Take 1 tablet (180 mg) by mouth once daily. 90 tablet 1    propranolol XL (Innopran XL) 120 mg 24 hr capsule Take 1 capsule (120 mg) by mouth once daily. 14 capsule 0    pseudoephedrine (Sudafed) 30 mg tablet Take 2 tablets (60 mg) by mouth every 6 hours if needed for congestion. 30 tablet     rosuvastatin (Crestor) 5 mg tablet Take 1 tablet (5 mg) by mouth once daily. 90 tablet 3    SUMAtriptan (Imitrex) 50 mg tablet Take 1 tablet (50 mg) by  mouth 2 times a day as needed for migraine. 9 tablet 3    warfarin (Jantoven) 5 mg tablet 1 tablet one day of the week, 0.5 tablet six days a week      [DISCONTINUED] fluticasone (Flonase Allergy Relief) 50 mcg/actuation nasal spray Administer 1 spray into each nostril once daily as needed for rhinitis or allergies. (Patient not taking: Reported on 4/12/2024) 16 g      No current facility-administered medications on file prior to visit.     Immunization History   Administered Date(s) Administered    Flu vaccine (IIV4), preservative free *Check age/dose* 10/21/2020, 11/10/2022    Flu vaccine, quadrivalent, high-dose, preservative free, age 65y+ (FLUZONE) 10/13/2023    Flu vaccine, quadrivalent, recombinant, preservative free, adult (FLUBLOK) 10/25/2021    Influenza, injectable, quadrivalent 10/06/2015    Influenza, seasonal, injectable 11/29/2012    Pfizer Purple Cap SARS-CoV-2 03/31/2021, 04/24/2021    Pneumococcal conjugate vaccine, 20-valent (PREVNAR 20) 04/03/2023     Patient's medical history was reviewed and updated either before or during this encounter.       Florin Worley MD

## 2024-09-11 ENCOUNTER — OFFICE VISIT (OUTPATIENT)
Dept: PRIMARY CARE | Facility: CLINIC | Age: 64
End: 2024-09-11
Payer: COMMERCIAL

## 2024-09-11 VITALS
BODY MASS INDEX: 29.68 KG/M2 | HEIGHT: 71 IN | WEIGHT: 212 LBS | SYSTOLIC BLOOD PRESSURE: 132 MMHG | DIASTOLIC BLOOD PRESSURE: 80 MMHG | OXYGEN SATURATION: 96 % | TEMPERATURE: 97.6 F | HEART RATE: 54 BPM

## 2024-09-11 DIAGNOSIS — L02.91 ABSCESS: Primary | ICD-10-CM

## 2024-09-11 PROCEDURE — 3008F BODY MASS INDEX DOCD: CPT | Performed by: STUDENT IN AN ORGANIZED HEALTH CARE EDUCATION/TRAINING PROGRAM

## 2024-09-11 PROCEDURE — 1036F TOBACCO NON-USER: CPT | Performed by: STUDENT IN AN ORGANIZED HEALTH CARE EDUCATION/TRAINING PROGRAM

## 2024-09-11 PROCEDURE — 3044F HG A1C LEVEL LT 7.0%: CPT | Performed by: STUDENT IN AN ORGANIZED HEALTH CARE EDUCATION/TRAINING PROGRAM

## 2024-09-11 PROCEDURE — 1126F AMNT PAIN NOTED NONE PRSNT: CPT | Performed by: STUDENT IN AN ORGANIZED HEALTH CARE EDUCATION/TRAINING PROGRAM

## 2024-09-11 PROCEDURE — 3079F DIAST BP 80-89 MM HG: CPT | Performed by: STUDENT IN AN ORGANIZED HEALTH CARE EDUCATION/TRAINING PROGRAM

## 2024-09-11 PROCEDURE — 1159F MED LIST DOCD IN RCRD: CPT | Performed by: STUDENT IN AN ORGANIZED HEALTH CARE EDUCATION/TRAINING PROGRAM

## 2024-09-11 PROCEDURE — 3075F SYST BP GE 130 - 139MM HG: CPT | Performed by: STUDENT IN AN ORGANIZED HEALTH CARE EDUCATION/TRAINING PROGRAM

## 2024-09-11 PROCEDURE — 99213 OFFICE O/P EST LOW 20 MIN: CPT | Performed by: STUDENT IN AN ORGANIZED HEALTH CARE EDUCATION/TRAINING PROGRAM

## 2024-09-11 PROCEDURE — 3048F LDL-C <100 MG/DL: CPT | Performed by: STUDENT IN AN ORGANIZED HEALTH CARE EDUCATION/TRAINING PROGRAM

## 2024-09-11 RX ORDER — CEPHALEXIN 500 MG/1
500 CAPSULE ORAL 4 TIMES DAILY
Qty: 40 CAPSULE | Refills: 0 | Status: SHIPPED | OUTPATIENT
Start: 2024-09-11 | End: 2024-09-21

## 2024-09-11 RX ORDER — DOXYCYCLINE 100 MG/1
100 CAPSULE ORAL 2 TIMES DAILY
Qty: 20 CAPSULE | Refills: 0 | Status: SHIPPED | OUTPATIENT
Start: 2024-09-11 | End: 2024-09-21

## 2024-09-11 ASSESSMENT — ENCOUNTER SYMPTOMS
CONSTITUTIONAL NEGATIVE: 1
GASTROINTESTINAL NEGATIVE: 1
RESPIRATORY NEGATIVE: 1
CARDIOVASCULAR NEGATIVE: 1

## 2024-09-11 ASSESSMENT — PAIN SCALES - GENERAL: PAINLEVEL: 0-NO PAIN

## 2024-09-11 NOTE — PATIENT INSTRUCTIONS
- Acute Care Visit for possible infected cyst. Patient notes that he has had this problem in the past that was treated by his general surgeon with antibiotics. This has been getting worse for the past week. Denies drainage.  - On exam, this appears to be an abscess. Image added under the physical exam section.  - Will treat with antibiotics. Will refer to general surgery.

## 2024-09-11 NOTE — PROGRESS NOTES
[facility] [place]: [facility]  PROGRESS NOTE      Kaiden Velasquez is a 64 y.o. male that is presenting today for Cyst.    Assessment/Plan   - Acute Care Visit for possible infected cyst. Patient notes that he has had this problem in the past that was treated by his general surgeon with antibiotics. This has been getting worse for the past week. Denies drainage.  - On exam, this appears to be an abscess. Image added under the physical exam section.  - Will treat with antibiotics. Will refer to general surgery.    Diagnoses and all orders for this visit:  Abscess  -     Referral to General Surgery; Future  -     cephalexin (Keflex) 500 mg capsule; Take 1 capsule (500 mg) by mouth 4 times a day for 10 days.  -     doxycycline (Vibramycin) 100 mg capsule; Take 1 capsule (100 mg) by mouth 2 times a day for 10 days. Take with at least 8 ounces (large glass) of water, do not lie down for 30 minutes after    Current Outpatient Medications   Medication Instructions    cephalexin (KEFLEX) 500 mg, oral, 4 times daily    cholecalciferol (VITAMIN D3) 50 mcg, oral, Daily    doxycycline (VIBRAMYCIN) 100 mg, oral, 2 times daily, Take with at least 8 ounces (large glass) of water, do not lie down for 30 minutes after    fexofenadine (ALLEGRA) 180 mg, oral, Daily    propranolol XL (INNOPRAN XL) 120 mg, oral, Daily    pseudoephedrine (SUDAFED) 60 mg, oral, Every 6 hours PRN    rosuvastatin (CRESTOR) 5 mg, oral, Daily    SUMAtriptan (IMITREX) 50 mg, oral, 2 times daily PRN    warfarin (Jantoven) 5 mg tablet 1 tablet one day of the week, 0.5 tablet six days a week     Subjective   - The patient otherwise feels well and denies any acute symptoms or concerns at this time.  - The patient denies any changes or progression of their chronic medical problems.  - The patient denies any problems or concerns with their medications.      Review of Systems   Constitutional: Negative.    Respiratory: Negative.     Cardiovascular:  "Negative.    Gastrointestinal: Negative.    All other systems reviewed and are negative.     Objective   Vitals:    09/11/24 1000   BP: 132/80   Pulse: 54   Temp: 36.4 °C (97.6 °F)   SpO2: 96%      Body mass index is 29.58 kg/m².  Physical Exam  Vitals and nursing note reviewed.   Constitutional:       General: He is not in acute distress.  Neck:      Vascular: No carotid bruit.   Cardiovascular:      Rate and Rhythm: Normal rate and regular rhythm.      Heart sounds: Normal heart sounds.   Pulmonary:      Effort: Pulmonary effort is normal.      Breath sounds: Normal breath sounds.   Musculoskeletal:         General: No swelling.   Neurological:      Mental Status: He is alert. Mental status is at baseline.   Psychiatric:         Mood and Affect: Mood normal.           Diagnostic Results   Lab Results   Component Value Date    GLUCOSE 188 (H) 04/06/2024    CALCIUM 9.0 04/06/2024     04/06/2024    K 5.1 04/06/2024    CO2 26 04/06/2024     04/06/2024    BUN 25 04/06/2024    CREATININE 1.40 04/06/2024     Lab Results   Component Value Date    ALT 23 04/06/2024    AST 18 04/06/2024    ALKPHOS 59 04/06/2024    BILITOT 0.6 04/06/2024     Lab Results   Component Value Date    WBC 6.3 04/06/2024    HGB 16.1 04/06/2024    HCT 48.7 04/06/2024    MCV 91 04/06/2024     04/06/2024     Lab Results   Component Value Date    CHOL 139 04/06/2024    CHOL 134 10/07/2023    CHOL 189 08/26/2022     Lab Results   Component Value Date    HDL 42.0 04/06/2024    HDL 51.0 10/07/2023    HDL 44 08/26/2022     Lab Results   Component Value Date    LDLCALC 72 04/06/2024    LDLCALC 62 (L) 10/07/2023    LDLCALC 118 08/26/2022     Lab Results   Component Value Date    TRIG 123 04/06/2024    TRIG 105 10/07/2023    TRIG 135 08/26/2022     No components found for: \"CHOLHDL\"  Lab Results   Component Value Date    HGBA1C 6.6 (H) 04/06/2024     Other labs not included in the list above were reviewed either before or during this " encounter.    History    History reviewed. No pertinent past medical history.  History reviewed. No pertinent surgical history.  Family History   Problem Relation Name Age of Onset    Arthritis Mother      Gout Mother      Diabetes Mother      Heart disease Father      Hypertension Father      Prostate cancer Father      Breast cancer Sister      Cancer Brother      No Known Problems Maternal Grandfather      Colon cancer Other      Hypertension Other       Social History     Socioeconomic History    Marital status:      Spouse name: Not on file    Number of children: Not on file    Years of education: Not on file    Highest education level: Not on file   Occupational History    Not on file   Tobacco Use    Smoking status: Never    Smokeless tobacco: Never   Vaping Use    Vaping status: Never Used   Substance and Sexual Activity    Alcohol use: Yes     Comment: occasionally    Drug use: Never    Sexual activity: Not on file   Other Topics Concern    Not on file   Social History Narrative    Not on file     Social Determinants of Health     Financial Resource Strain: Not on file   Food Insecurity: Not on file   Transportation Needs: Not on file   Physical Activity: Not on file   Stress: Not on file   Social Connections: Not on file   Intimate Partner Violence: Not on file   Housing Stability: Not on file     No Known Allergies  Current Outpatient Medications on File Prior to Visit   Medication Sig Dispense Refill    cholecalciferol (Vitamin D3) 50 mcg (2,000 unit) capsule Take 1 capsule (50 mcg) by mouth once daily.      fexofenadine (Allegra) 180 mg tablet Take 1 tablet (180 mg) by mouth once daily. 90 tablet 3    propranolol XL (Innopran XL) 120 mg 24 hr capsule Take 1 capsule (120 mg) by mouth once daily. 90 capsule 3    pseudoephedrine (Sudafed) 30 mg tablet Take 2 tablets (60 mg) by mouth every 6 hours if needed for congestion. 30 tablet     rosuvastatin (Crestor) 5 mg tablet Take 1 tablet (5 mg) by mouth  once daily. 90 tablet 3    SUMAtriptan (Imitrex) 50 mg tablet Take 1 tablet (50 mg) by mouth 2 times a day as needed for migraine. 9 tablet 3    warfarin (Jantoven) 5 mg tablet 1 tablet one day of the week, 0.5 tablet six days a week 90 tablet 3    [DISCONTINUED] cholecalciferol (Vitamin D3) 25 MCG (1000 UT) capsule Take 1 capsule (25 mcg) by mouth once daily. (Patient not taking: Reported on 9/11/2024)       No current facility-administered medications on file prior to visit.     Immunization History   Administered Date(s) Administered    Flu vaccine (IIV4), preservative free *Check age/dose* 10/21/2020, 11/10/2022    Flu vaccine, quadrivalent, high-dose, preservative free, age 65y+ (FLUZONE) 10/13/2023    Flu vaccine, quadrivalent, recombinant, preservative free, adult (FLUBLOK) 10/25/2021    Influenza, injectable, quadrivalent 10/06/2015    Influenza, seasonal, injectable 11/29/2012    Pfizer Purple Cap SARS-CoV-2 03/31/2021, 04/24/2021    Pneumococcal conjugate vaccine, 20-valent (PREVNAR 20) 04/03/2023     Patient's medical history was reviewed and updated either before or during this encounter.       Florin Worley MD

## 2024-09-13 NOTE — PROGRESS NOTES
Subjective   Patient ID: Kaiden Velasquez is a 64 y.o. male who presents for infected cyst/abscess to back of neck.  HPI  Patient is new to clinic and presents to clinic with referral for infected cyst/abscess to back of neck.  Patient is referred by Dr. Worley.  Patient Magdy area first erupted 2 years ago.  At that time he was seen by another surgeon.  He was placed on antibiotics and it resolved.  2 weeks ago the area started to increase in size and pain.  Patient subsequently saw the primary care physician who started antibiotics 6 days ago.  4 days ago it ruptured on its own.  Currently with minimal drainage.  Patient is had a cyst removed from his right upper extremity during childhood but no other episodes similar to this.  Patient is on blood thinners.  Patient was started on Cephalexin 500mg 1 tab QID x 10 days and Doxycycline 100mg 1 cap BID x 10 days on 9/11/2024.    Current Outpatient Medications on File Prior to Visit   Medication Sig Dispense Refill    cephalexin (Keflex) 500 mg capsule Take 1 capsule (500 mg) by mouth 4 times a day for 10 days. 40 capsule 0    cholecalciferol (Vitamin D3) 50 mcg (2,000 unit) capsule Take 1 capsule (50 mcg) by mouth once daily.      doxycycline (Vibramycin) 100 mg capsule Take 1 capsule (100 mg) by mouth 2 times a day for 10 days. Take with at least 8 ounces (large glass) of water, do not lie down for 30 minutes after 20 capsule 0    fexofenadine (Allegra) 180 mg tablet Take 1 tablet (180 mg) by mouth once daily. 90 tablet 3    propranolol XL (Innopran XL) 120 mg 24 hr capsule Take 1 capsule (120 mg) by mouth once daily. 90 capsule 3    pseudoephedrine (Sudafed) 30 mg tablet Take 2 tablets (60 mg) by mouth every 6 hours if needed for congestion. 30 tablet     rosuvastatin (Crestor) 5 mg tablet Take 1 tablet (5 mg) by mouth once daily. 90 tablet 3    SUMAtriptan (Imitrex) 50 mg tablet Take 1 tablet (50 mg) by mouth 2 times a day as needed for migraine. 9 tablet 3     "warfarin (Jantoven) 5 mg tablet 1 tablet one day of the week, 0.5 tablet six days a week 90 tablet 3    [DISCONTINUED] cholecalciferol (Vitamin D3) 25 MCG (1000 UT) capsule Take 1 capsule (25 mcg) by mouth once daily. (Patient not taking: Reported on 9/11/2024)       No current facility-administered medications on file prior to visit.     Past Surgical History:   Procedure Laterality Date    BACK SURGERY  2004    lumbar    NECK SURGERY  2014     Family History   Problem Relation Name Age of Onset    Arthritis Mother      Gout Mother      Diabetes Mother      Heart disease Father      Hypertension Father      Prostate cancer Father      Breast cancer Sister      Cancer Brother      No Known Problems Maternal Grandfather      Colon cancer Other      Hypertension Other       Vitals:    09/17/24 1340   BP: 152/85   Pulse: 77   Temp: 36.4 °C (97.6 °F)   SpO2: 99%     No Known Allergies    Social History:  Tobacco Use - no  Do you use any recreational drugs -no   Alcohol Use -ocassional  Caffeine Intake - pop   Working - fulltime  Marital status -   Living with - wife  Review of Systems    Objective   Physical Exam  /85 (BP Location: Left arm, Patient Position: Sitting, BP Cuff Size: Adult)   Pulse 77   Temp 36.4 °C (97.6 °F) (Temporal)   Ht 1.778 m (5' 10\")   Wt 96.2 kg (212 lb)   SpO2 99%   PF 80 L/min   BMI 30.42 kg/m²    GENERAL APPEARANCE: Patient appears in no acute distress.   EYES: Sclera non-icteric, PERRLA.   ENT Normal appearance of ears and nose.   NECK/THYROID: Neck: Posterior neck with a inclusion cyst with granulation tissue from previous rupture.  The cyst wall area deep to the epithelium measures approximately 3 cm x 1 cm.  No current drainage.  Definite fluctuance.  No surrounding cellulitis.  Thyroid: no masses.   LYMPH NODES: No cervical or supraclavicular lymphadenopathy.   CARDIOVASCULAR Heart: RRR, no murmurs; Carotid bruits: none; Peripheral edema: none.   RESPIRATORY: Lungs: " clear to auscultation bilaterally; no respiratory distress.   GI (ABDOMEN) No intraabdominal mass appreciated, no hepatosplenomegaly; Hernia: none; Tenderness: none.   PSYCH: Patient oriented to time, place and person, normal affect.      PROCEDURE : Posterior neck was prepped and draped in a sterile fashion.  1% lidocaine with epi was infiltrated into the dermis.  11 blade scalpel was used to make a cruciate incision over the area.  Several cc of keratinous material was evacuated from the area.  Cultures were obtained.  Quarter inch plain packing gauze was placed into the wound.  Patient with minimal oozing which stopped with direct pressure.  Dry dressing applied.  Assessment/Plan   Problem List Items Addressed This Visit             ICD-10-CM    Infected epithelial inclusion cyst - Primary L72.0, L08.9     Patient status post I&D of infected inclusion cyst.  We got more material out of the cyst.  Quarter inch plain packing gauze placed into the wound dry dressing applied.  Patient had minimal oozing.  Recommend he remove the gauze in 2 weeks.  Continue taking antibiotics to completion.  Cultures were sent.  Patient to follow-up in 2 weeks.  If this is completely resolves we will continue to can treat conservatively.  If he has a residual cyst we can excised under local once infection and inflammation has resolved.          Other Visit Diagnoses         Codes    Abscess     L02.91                 Jodie Dennis MD 09/17/24 2:39 PM

## 2024-09-17 ENCOUNTER — OFFICE VISIT (OUTPATIENT)
Dept: SURGERY | Facility: CLINIC | Age: 64
End: 2024-09-17
Payer: COMMERCIAL

## 2024-09-17 VITALS
TEMPERATURE: 97.6 F | BODY MASS INDEX: 30.35 KG/M2 | OXYGEN SATURATION: 99 % | WEIGHT: 212 LBS | HEART RATE: 77 BPM | HEIGHT: 70 IN | SYSTOLIC BLOOD PRESSURE: 152 MMHG | DIASTOLIC BLOOD PRESSURE: 85 MMHG

## 2024-09-17 DIAGNOSIS — L08.9 INFECTED EPITHELIAL INCLUSION CYST: Primary | ICD-10-CM

## 2024-09-17 DIAGNOSIS — L72.0 INFECTED EPITHELIAL INCLUSION CYST: Primary | ICD-10-CM

## 2024-09-17 DIAGNOSIS — L02.91 ABSCESS: ICD-10-CM

## 2024-09-17 PROCEDURE — 10061 I&D ABSCESS COMP/MULTIPLE: CPT | Performed by: SURGERY

## 2024-09-17 PROCEDURE — 3044F HG A1C LEVEL LT 7.0%: CPT | Performed by: SURGERY

## 2024-09-17 PROCEDURE — 87077 CULTURE AEROBIC IDENTIFY: CPT | Mod: WESLAB | Performed by: SURGERY

## 2024-09-17 PROCEDURE — 1159F MED LIST DOCD IN RCRD: CPT | Performed by: SURGERY

## 2024-09-17 PROCEDURE — 3079F DIAST BP 80-89 MM HG: CPT | Performed by: SURGERY

## 2024-09-17 PROCEDURE — 1126F AMNT PAIN NOTED NONE PRSNT: CPT | Performed by: SURGERY

## 2024-09-17 PROCEDURE — 3008F BODY MASS INDEX DOCD: CPT | Performed by: SURGERY

## 2024-09-17 PROCEDURE — 99204 OFFICE O/P NEW MOD 45 MIN: CPT | Performed by: SURGERY

## 2024-09-17 PROCEDURE — 1036F TOBACCO NON-USER: CPT | Performed by: SURGERY

## 2024-09-17 PROCEDURE — 99214 OFFICE O/P EST MOD 30 MIN: CPT | Mod: 25 | Performed by: SURGERY

## 2024-09-17 PROCEDURE — 3048F LDL-C <100 MG/DL: CPT | Performed by: SURGERY

## 2024-09-17 PROCEDURE — 3077F SYST BP >= 140 MM HG: CPT | Performed by: SURGERY

## 2024-09-17 ASSESSMENT — COLUMBIA-SUICIDE SEVERITY RATING SCALE - C-SSRS
2. HAVE YOU ACTUALLY HAD ANY THOUGHTS OF KILLING YOURSELF?: NO
6. HAVE YOU EVER DONE ANYTHING, STARTED TO DO ANYTHING, OR PREPARED TO DO ANYTHING TO END YOUR LIFE?: NO
1. IN THE PAST MONTH, HAVE YOU WISHED YOU WERE DEAD OR WISHED YOU COULD GO TO SLEEP AND NOT WAKE UP?: NO

## 2024-09-17 ASSESSMENT — PATIENT HEALTH QUESTIONNAIRE - PHQ9
1. LITTLE INTEREST OR PLEASURE IN DOING THINGS: NOT AT ALL
SUM OF ALL RESPONSES TO PHQ9 QUESTIONS 1 & 2: 0
2. FEELING DOWN, DEPRESSED OR HOPELESS: NOT AT ALL

## 2024-09-17 ASSESSMENT — ENCOUNTER SYMPTOMS
DEPRESSION: 0
LOSS OF SENSATION IN FEET: 0
OCCASIONAL FEELINGS OF UNSTEADINESS: 0

## 2024-09-17 ASSESSMENT — PAIN SCALES - GENERAL: PAINLEVEL: 0-NO PAIN

## 2024-09-17 NOTE — ASSESSMENT & PLAN NOTE
Patient status post I&D of infected inclusion cyst.  We got more material out of the cyst.  Quarter inch plain packing gauze placed into the wound dry dressing applied.  Patient had minimal oozing.  Recommend he remove the gauze in 2 weeks.  Continue taking antibiotics to completion.  Cultures were sent.  Patient to follow-up in 2 weeks.  If this is completely resolves we will continue to can treat conservatively.  If he has a residual cyst we can excised under local once infection and inflammation has resolved.

## 2024-09-21 LAB
BACTERIA SPEC CULT: ABNORMAL
BACTERIA SPEC CULT: ABNORMAL
GRAM STN SPEC: ABNORMAL
GRAM STN SPEC: ABNORMAL

## 2024-09-27 NOTE — H&P (VIEW-ONLY)
Subjective   Patient ID: Kaiden Velaqsuez is a 64 y.o. male who presents for 2 week follow up for infected epithelial inclusion cyst.  HPI  Patient returns to clinic for 2 week follow up for infected epithelial inclusion cyst.  Patient last seen in office on 9/17/2024 for consult for infected epithelial inclusion cyst.  Patient underwent I&D of posterior neck infected inclusion cyst.  Patient had been given keflex and doxy per primary.  Denies any pain and the lump is now flattened out.    Tissue/Wound Culture/Smear (4+) Abundant Klebsiella (Enterobacter) aerogenes Abnormal    SECOND and THIRD generation cephalosporin results are not reported as resistance may develop during therapy with these agents.   (1+) Rare Mixed Gram-Positive and Gram-Negative Bacteria               Gram Stain  Abnormal   No polymorphonuclear leukocytes seen      (4+) Abundant Mixed Gram positive and Gram negative bacteria           Vitals:    10/03/24 1121   BP: (!) 185/113   Pulse: 62   Temp: 36.6 °C (97.8 °F)   SpO2:        No Known Allergies    History reviewed. No pertinent past medical history.    Past Surgical History:   Procedure Laterality Date    BACK SURGERY  2004    lumbar    NECK SURGERY  2014     Family History   Problem Relation Name Age of Onset    Arthritis Mother      Gout Mother      Diabetes Mother      Heart disease Father      Hypertension Father      Prostate cancer Father      Breast cancer Sister      Cancer Brother      No Known Problems Maternal Grandfather      Colon cancer Other      Hypertension Other                             Social History:  Tobacco Use - no  Do you use any recreational drugs -no   Alcohol Use -ocassional  Caffeine Intake - pop   Working - fulltime  Marital status -   Living with - wife  Review of Systems  Review of Systems    Objective   Physical Exam  Patient with residual pit and small lump in the area.  Measures approximately 2 cm.  No fluctuance no infection no  cellulitis  Assessment/Plan   Problem List Items Addressed This Visit             ICD-10-CM    Infected epithelial inclusion cyst - Primary L72.0, L08.9     Infection has resolved.  Healing of the inclusion cyst.  Cyst wall still present.  Recommend excision under local versus conservative management.  This was a second eruption.  Patient wishes to have it excised.  Risk-benefit alternatives of excising under local were thoroughly discussed with the patient agrees to proceed.  Patient does have factor V Leiden and has come off of it without bridging and not developing clots as a result he will stop his Coumadin at the appropriate time and likely will resume it the day after the procedure.                 Jodie Dennis MD 10/03/24 11:38 AM

## 2024-09-27 NOTE — PROGRESS NOTES
Subjective   Patient ID: Kaiden Velasquez is a 64 y.o. male who presents for 2 week follow up for infected epithelial inclusion cyst.  HPI  Patient returns to clinic for 2 week follow up for infected epithelial inclusion cyst.  Patient last seen in office on 9/17/2024 for consult for infected epithelial inclusion cyst.  Patient underwent I&D of posterior neck infected inclusion cyst.  Patient had been given keflex and doxy per primary.  Denies any pain and the lump is now flattened out.    Tissue/Wound Culture/Smear (4+) Abundant Klebsiella (Enterobacter) aerogenes Abnormal    SECOND and THIRD generation cephalosporin results are not reported as resistance may develop during therapy with these agents.   (1+) Rare Mixed Gram-Positive and Gram-Negative Bacteria               Gram Stain  Abnormal   No polymorphonuclear leukocytes seen      (4+) Abundant Mixed Gram positive and Gram negative bacteria           Vitals:    10/03/24 1121   BP: (!) 185/113   Pulse: 62   Temp: 36.6 °C (97.8 °F)   SpO2:        No Known Allergies    History reviewed. No pertinent past medical history.    Past Surgical History:   Procedure Laterality Date    BACK SURGERY  2004    lumbar    NECK SURGERY  2014     Family History   Problem Relation Name Age of Onset    Arthritis Mother      Gout Mother      Diabetes Mother      Heart disease Father      Hypertension Father      Prostate cancer Father      Breast cancer Sister      Cancer Brother      No Known Problems Maternal Grandfather      Colon cancer Other      Hypertension Other                             Social History:  Tobacco Use - no  Do you use any recreational drugs -no   Alcohol Use -ocassional  Caffeine Intake - pop   Working - fulltime  Marital status -   Living with - wife  Review of Systems  Review of Systems    Objective   Physical Exam  Patient with residual pit and small lump in the area.  Measures approximately 2 cm.  No fluctuance no infection no  cellulitis  Assessment/Plan   Problem List Items Addressed This Visit             ICD-10-CM    Infected epithelial inclusion cyst - Primary L72.0, L08.9     Infection has resolved.  Healing of the inclusion cyst.  Cyst wall still present.  Recommend excision under local versus conservative management.  This was a second eruption.  Patient wishes to have it excised.  Risk-benefit alternatives of excising under local were thoroughly discussed with the patient agrees to proceed.  Patient does have factor V Leiden and has come off of it without bridging and not developing clots as a result he will stop his Coumadin at the appropriate time and likely will resume it the day after the procedure.                 Jodie Dennis MD 10/03/24 11:38 AM

## 2024-10-03 ENCOUNTER — OFFICE VISIT (OUTPATIENT)
Dept: SURGERY | Facility: CLINIC | Age: 64
End: 2024-10-03
Payer: COMMERCIAL

## 2024-10-03 ENCOUNTER — TELEPHONE (OUTPATIENT)
Dept: SURGERY | Facility: CLINIC | Age: 64
End: 2024-10-03

## 2024-10-03 VITALS
DIASTOLIC BLOOD PRESSURE: 113 MMHG | BODY MASS INDEX: 30.35 KG/M2 | HEIGHT: 70 IN | WEIGHT: 212 LBS | OXYGEN SATURATION: 99 % | HEART RATE: 62 BPM | TEMPERATURE: 97.8 F | SYSTOLIC BLOOD PRESSURE: 185 MMHG

## 2024-10-03 DIAGNOSIS — L08.9 INFECTED EPITHELIAL INCLUSION CYST: Primary | ICD-10-CM

## 2024-10-03 DIAGNOSIS — L72.0 INFECTED EPITHELIAL INCLUSION CYST: Primary | ICD-10-CM

## 2024-10-03 PROCEDURE — 3008F BODY MASS INDEX DOCD: CPT | Performed by: SURGERY

## 2024-10-03 PROCEDURE — 99213 OFFICE O/P EST LOW 20 MIN: CPT | Performed by: SURGERY

## 2024-10-03 PROCEDURE — 1125F AMNT PAIN NOTED PAIN PRSNT: CPT | Performed by: SURGERY

## 2024-10-03 PROCEDURE — 1036F TOBACCO NON-USER: CPT | Performed by: SURGERY

## 2024-10-03 PROCEDURE — 3077F SYST BP >= 140 MM HG: CPT | Performed by: SURGERY

## 2024-10-03 PROCEDURE — 3048F LDL-C <100 MG/DL: CPT | Performed by: SURGERY

## 2024-10-03 PROCEDURE — 3080F DIAST BP >= 90 MM HG: CPT | Performed by: SURGERY

## 2024-10-03 PROCEDURE — 1159F MED LIST DOCD IN RCRD: CPT | Performed by: SURGERY

## 2024-10-03 PROCEDURE — 3044F HG A1C LEVEL LT 7.0%: CPT | Performed by: SURGERY

## 2024-10-03 ASSESSMENT — PATIENT HEALTH QUESTIONNAIRE - PHQ9
1. LITTLE INTEREST OR PLEASURE IN DOING THINGS: NOT AT ALL
SUM OF ALL RESPONSES TO PHQ9 QUESTIONS 1 & 2: 0
2. FEELING DOWN, DEPRESSED OR HOPELESS: NOT AT ALL
SUM OF ALL RESPONSES TO PHQ9 QUESTIONS 1 & 2: 0
2. FEELING DOWN, DEPRESSED OR HOPELESS: NOT AT ALL
1. LITTLE INTEREST OR PLEASURE IN DOING THINGS: NOT AT ALL

## 2024-10-03 ASSESSMENT — COLUMBIA-SUICIDE SEVERITY RATING SCALE - C-SSRS
2. HAVE YOU ACTUALLY HAD ANY THOUGHTS OF KILLING YOURSELF?: NO
1. IN THE PAST MONTH, HAVE YOU WISHED YOU WERE DEAD OR WISHED YOU COULD GO TO SLEEP AND NOT WAKE UP?: NO
6. HAVE YOU EVER DONE ANYTHING, STARTED TO DO ANYTHING, OR PREPARED TO DO ANYTHING TO END YOUR LIFE?: NO

## 2024-10-03 ASSESSMENT — PAIN SCALES - GENERAL
PAINLEVEL: 0-NO PAIN
PAINLEVEL: 4

## 2024-10-03 NOTE — TELEPHONE ENCOUNTER
You are scheduled for an Outpatient Surgical Procedure on 10/11/2024 at [facility] with Dr. Jodie Dennis    [address] [address]      Pre Testing   [] It may be medically necessary to complete Pre-Admission Testing prior to your procedure.  If needed, you will be notified with the appointment details.      Pre Admission Testing Appointments will be scheduled at [facility] or [facility].     Preparing for your Surgery    [x] Stop Taking Blood Thinners such as Aspirin, Coumadin, Plavix, Ibuprofen 5 Days Prior to Procedure   [] Stop Taking Blood Thinners such as Pradaxa, Xarelto, Eliquis, Savaysa 2 Days Prior to Procedure   [] You May take Morning Heart Medications with few sips of water      [x] You will need to make sure that you are hydrated  [x] You May eat a small meal prior to your procedure    Arrival Time    THE DAY BEFORE your procedure call the hospital [phone] after 2 pm for your official arrival time    Check In    Enter through the Main Lobby and take the visitor elevator to the Second Floor (Same Day Surgery Floor)    Please arrive promptly at your scheduled time and the staff at the surgery desk will assist you.      Follow Up    [x] A Post Operative Visit with Dr. Dennis is scheduled on 10/24/2024 at 11:15am           Questions / Concerns   Please feel free to Contact Dr. Dennis's office at [phone]

## 2024-10-03 NOTE — ASSESSMENT & PLAN NOTE
Infection has resolved.  Healing of the inclusion cyst.  Cyst wall still present.  Recommend excision under local versus conservative management.  This was a second eruption.  Patient wishes to have it excised.  Risk-benefit alternatives of excising under local were thoroughly discussed with the patient agrees to proceed.  Patient does have factor V Leiden and has come off of it without bridging and not developing clots as a result he will stop his Coumadin at the appropriate time and likely will resume it the day after the procedure.

## 2024-10-09 ENCOUNTER — APPOINTMENT (OUTPATIENT)
Dept: PREADMISSION TESTING | Facility: HOSPITAL | Age: 64
End: 2024-10-09
Payer: COMMERCIAL

## 2024-10-11 ENCOUNTER — HOSPITAL ENCOUNTER (OUTPATIENT)
Facility: HOSPITAL | Age: 64
Setting detail: OUTPATIENT SURGERY
Discharge: HOME | End: 2024-10-11
Attending: SURGERY | Admitting: SURGERY
Payer: COMMERCIAL

## 2024-10-11 VITALS — OXYGEN SATURATION: 100 % | SYSTOLIC BLOOD PRESSURE: 188 MMHG | DIASTOLIC BLOOD PRESSURE: 99 MMHG | HEART RATE: 61 BPM

## 2024-10-11 DIAGNOSIS — L72.0 INFECTED EPITHELIAL INCLUSION CYST: ICD-10-CM

## 2024-10-11 DIAGNOSIS — L08.9 INFECTED EPITHELIAL INCLUSION CYST: ICD-10-CM

## 2024-10-11 PROCEDURE — 3600000006 HC OR TIME - EACH INCREMENTAL 1 MINUTE - PROCEDURE LEVEL ONE: Performed by: SURGERY

## 2024-10-11 PROCEDURE — 11406 EXC TR-EXT B9+MARG >4.0 CM: CPT | Performed by: SURGERY

## 2024-10-11 PROCEDURE — 3600000008 HC OR TIME - EACH INCREMENTAL 1 MINUTE - PROCEDURE LEVEL THREE: Performed by: SURGERY

## 2024-10-11 PROCEDURE — 3600000003 HC OR TIME - INITIAL BASE CHARGE - PROCEDURE LEVEL THREE: Performed by: SURGERY

## 2024-10-11 PROCEDURE — 3600000001 HC OR TIME - INITIAL BASE CHARGE - PROCEDURE LEVEL ONE: Performed by: SURGERY

## 2024-10-11 PROCEDURE — 88304 TISSUE EXAM BY PATHOLOGIST: CPT | Mod: TC | Performed by: SURGERY

## 2024-10-11 PROCEDURE — 2500000004 HC RX 250 GENERAL PHARMACY W/ HCPCS (ALT 636 FOR OP/ED): Performed by: SURGERY

## 2024-10-11 RX ORDER — LIDOCAINE HYDROCHLORIDE AND EPINEPHRINE 10; 10 MG/ML; UG/ML
INJECTION, SOLUTION INFILTRATION; PERINEURAL AS NEEDED
Status: DISCONTINUED | OUTPATIENT
Start: 2024-10-11 | End: 2024-10-11 | Stop reason: HOSPADM

## 2024-10-11 NOTE — OP NOTE
Excision Cyst Posterior Neck Operative Note     Date: 10/11/2024  OR Location: [facility]    Name: Kaiden Velasquez, : 1960, Age: 64 y.o., MRN: [id number], Sex: male    Diagnosis  Pre-op Diagnosis      * Infected epithelial inclusion cyst [L72.0, L08.9] Post-op Diagnosis     * Infected epithelial inclusion cyst [L72.0, L08.9]     Procedures  Excision Cyst Posterior Neck  99063 - HI EXC B9 LESION MRGN XCP SK TG T/A/L >4.0 CM      Surgeons      * Jodie Dennis - Primary    Resident/Fellow/Other Assistant:  Surgeons and Role:  * No surgeons found with a matching role *    Procedure Summary  Anesthesia: Anesthesia type not filed in the log.  ASA: ASA status not filed in the log.  Anesthesia Staff: No anesthesia staff entered.  Estimated Blood Loss: 1mL  Intra-op Medications:   Administrations occurring from 1215 to 1315 on 10/11/24:   Medication Name Total Dose   lidocaine-epinephrine (Xylocaine W/EPI) 1 %-1:100,000 injection 7 mL         Intraprocedure I/O Totals       None           Specimen:   ID Type Source Tests Collected by Time   1 : right posterior neck mass Tissue SOFT TISSUE MASS RESECTION SURGICAL PATHOLOGY EXAM Jodie Dennis MD 10/11/2024 1309        Staff:   Circulator: Basil         Drains and/or Catheters: * None in log *    Tourniquet Times:         Implants:     Findings: [id number] excision    Indications: Kaiden Velasquez is an 64 y.o. male who is having surgery for Infected epithelial inclusion cyst [L72.0, L08.9].     The patient was seen in the preoperative area. The risks, benefits, complications, treatment options, non-operative alternatives, expected recovery and outcomes were discussed with the patient. The possibilities of reaction to medication, pulmonary aspiration, injury to surrounding structures, bleeding, recurrent infection, the need for additional procedures, failure to diagnose a condition, and creating a complication requiring transfusion or operation were discussed with the  patient. The patient concurred with the proposed plan, giving informed consent.  The site of surgery was properly noted/marked if necessary per policy. The patient has been actively warmed in preoperative area. Preoperative antibiotics are not indicated. Venous thrombosis prophylaxis are not indicated.    Procedure Details: Patient brought the room in the supine position.  Patient was then laid in the left lateral decub position.  Posterior neck was prepped and draped in a sterile fashion.  Marking pen was used to delineate a 4 cm x 1 cm elliptical incision around the lesion in the posterior neck.  1% lidocaine with epinephrine was infiltrated the dermis 15 blade scalpel used to make a 4 cm x 1 cm elliptical incision.  The lesion was sharply dissected down to the normal subcutaneous fatty tissue.  Electrocautery was used to achieve hemostasis.  Excisional bed measured 4 cm x 1 cm.  The lesion was excised in its entirety.  After we had adequate hemostasis the incision was closed with interrupted 3-0 Vicryl followed by running 4-0 Monocryl Steri-Strips sterile dressing are applied patient Toller procedure well  Complications:  None; patient tolerated the procedure well.    Disposition:  home  Condition: stable         Additional Details:     Attending Attestation: I was present and scrubbed for the entire procedure.    Jodie Dennis  Phone Number: [phone]

## 2024-10-11 NOTE — NURSING NOTE
Pt states he has stopped his coumadin 5 days ago. Dr. Dennis made aware.     Also, pt made aware that his BP is elevated. He states that it was elevated at his last appt too. He confirmed that he took his blood pressure pill this morning. I explained to patient that he needs to follow up with his PCP to evaluate hypertension.

## 2024-10-14 ENCOUNTER — LAB (OUTPATIENT)
Dept: LAB | Facility: LAB | Age: 64
End: 2024-10-14
Payer: COMMERCIAL

## 2024-10-14 DIAGNOSIS — D68.51 ACTIVATED PROTEIN C RESISTANCE (MULTI): Primary | ICD-10-CM

## 2024-10-14 DIAGNOSIS — N18.31 STAGE 3A CHRONIC KIDNEY DISEASE (MULTI): ICD-10-CM

## 2024-10-14 DIAGNOSIS — E78.2 MIXED HYPERLIPIDEMIA: ICD-10-CM

## 2024-10-14 DIAGNOSIS — I10 PRIMARY HYPERTENSION: ICD-10-CM

## 2024-10-14 DIAGNOSIS — Z01.89 ENCOUNTER FOR ROUTINE LABORATORY TESTING: ICD-10-CM

## 2024-10-14 DIAGNOSIS — E11.9 TYPE 2 DIABETES MELLITUS WITHOUT COMPLICATION, WITHOUT LONG-TERM CURRENT USE OF INSULIN (MULTI): ICD-10-CM

## 2024-10-14 LAB
ALBUMIN SERPL BCP-MCNC: 4.2 G/DL (ref 3.4–5)
ALP SERPL-CCNC: 52 U/L (ref 33–136)
ALT SERPL W P-5'-P-CCNC: 25 U/L (ref 10–52)
ANION GAP SERPL CALCULATED.3IONS-SCNC: 8 MMOL/L (ref 10–20)
AST SERPL W P-5'-P-CCNC: 20 U/L (ref 9–39)
BASOPHILS # BLD AUTO: 0.05 X10*3/UL (ref 0–0.1)
BASOPHILS NFR BLD AUTO: 0.7 %
BILIRUB DIRECT SERPL-MCNC: 0.2 MG/DL (ref 0–0.3)
BILIRUB SERPL-MCNC: 1 MG/DL (ref 0–1.2)
BUN SERPL-MCNC: 24 MG/DL (ref 6–23)
CALCIUM SERPL-MCNC: 9.2 MG/DL (ref 8.6–10.3)
CHLORIDE SERPL-SCNC: 106 MMOL/L (ref 98–107)
CO2 SERPL-SCNC: 31 MMOL/L (ref 21–32)
CREAT SERPL-MCNC: 1.33 MG/DL (ref 0.5–1.3)
CREAT UR-MCNC: 198.7 MG/DL (ref 20–370)
EGFRCR SERPLBLD CKD-EPI 2021: 59 ML/MIN/1.73M*2
EOSINOPHIL # BLD AUTO: 0.24 X10*3/UL (ref 0–0.7)
EOSINOPHIL NFR BLD AUTO: 3.5 %
ERYTHROCYTE [DISTWIDTH] IN BLOOD BY AUTOMATED COUNT: 13.1 % (ref 11.5–14.5)
GLUCOSE SERPL-MCNC: 127 MG/DL (ref 74–99)
HCT VFR BLD AUTO: 49.6 % (ref 41–52)
HGB BLD-MCNC: 16.5 G/DL (ref 13.5–17.5)
IMM GRANULOCYTES # BLD AUTO: 0.02 X10*3/UL (ref 0–0.7)
IMM GRANULOCYTES NFR BLD AUTO: 0.3 % (ref 0–0.9)
INR PPP: 1.1 (ref 0.9–1.2)
LYMPHOCYTES # BLD AUTO: 1.52 X10*3/UL (ref 1.2–4.8)
LYMPHOCYTES NFR BLD AUTO: 22 %
MCH RBC QN AUTO: 30.3 PG (ref 26–34)
MCHC RBC AUTO-ENTMCNC: 33.3 G/DL (ref 32–36)
MCV RBC AUTO: 91 FL (ref 80–100)
MICROALBUMIN UR-MCNC: 25.8 MG/L
MICROALBUMIN/CREAT UR: 13 UG/MG CREAT
MONOCYTES # BLD AUTO: 0.66 X10*3/UL (ref 0.1–1)
MONOCYTES NFR BLD AUTO: 9.6 %
NEUTROPHILS # BLD AUTO: 4.41 X10*3/UL (ref 1.2–7.7)
NEUTROPHILS NFR BLD AUTO: 63.9 %
NRBC BLD-RTO: 0 /100 WBCS (ref 0–0)
PLATELET # BLD AUTO: 185 X10*3/UL (ref 150–450)
POTASSIUM SERPL-SCNC: 4.7 MMOL/L (ref 3.5–5.3)
PROT SERPL-MCNC: 6.5 G/DL (ref 6.4–8.2)
PROTHROMBIN TIME: 11.5 SECONDS (ref 9.3–12.7)
RBC # BLD AUTO: 5.44 X10*6/UL (ref 4.5–5.9)
SODIUM SERPL-SCNC: 140 MMOL/L (ref 136–145)
WBC # BLD AUTO: 6.9 X10*3/UL (ref 4.4–11.3)

## 2024-10-14 PROCEDURE — 36415 COLL VENOUS BLD VENIPUNCTURE: CPT

## 2024-10-14 PROCEDURE — 82570 ASSAY OF URINE CREATININE: CPT

## 2024-10-14 PROCEDURE — 85025 COMPLETE CBC W/AUTO DIFF WBC: CPT

## 2024-10-14 PROCEDURE — 82043 UR ALBUMIN QUANTITATIVE: CPT

## 2024-10-14 PROCEDURE — 80053 COMPREHEN METABOLIC PANEL: CPT

## 2024-10-14 PROCEDURE — 83036 HEMOGLOBIN GLYCOSYLATED A1C: CPT

## 2024-10-14 PROCEDURE — 85610 PROTHROMBIN TIME: CPT

## 2024-10-14 PROCEDURE — 82248 BILIRUBIN DIRECT: CPT

## 2024-10-15 LAB
EST. AVERAGE GLUCOSE BLD GHB EST-MCNC: 123 MG/DL
HBA1C MFR BLD: 5.9 %
LABORATORY COMMENT REPORT: NORMAL
PATH REPORT.FINAL DX SPEC: NORMAL
PATH REPORT.GROSS SPEC: NORMAL
PATH REPORT.RELEVANT HX SPEC: NORMAL
PATH REPORT.TOTAL CANCER: NORMAL

## 2024-10-18 ENCOUNTER — OFFICE VISIT (OUTPATIENT)
Dept: PRIMARY CARE | Facility: CLINIC | Age: 64
End: 2024-10-18
Payer: COMMERCIAL

## 2024-10-18 VITALS
HEIGHT: 70 IN | BODY MASS INDEX: 30.06 KG/M2 | HEART RATE: 69 BPM | DIASTOLIC BLOOD PRESSURE: 80 MMHG | SYSTOLIC BLOOD PRESSURE: 132 MMHG | WEIGHT: 210 LBS | OXYGEN SATURATION: 97 %

## 2024-10-18 DIAGNOSIS — D68.51 FACTOR V LEIDEN (MULTI): ICD-10-CM

## 2024-10-18 DIAGNOSIS — S39.012S BACK STRAIN, SEQUELA: ICD-10-CM

## 2024-10-18 DIAGNOSIS — E55.9 VITAMIN D DEFICIENCY: ICD-10-CM

## 2024-10-18 DIAGNOSIS — E11.9 TYPE 2 DIABETES MELLITUS WITHOUT COMPLICATION, WITHOUT LONG-TERM CURRENT USE OF INSULIN (MULTI): Primary | ICD-10-CM

## 2024-10-18 DIAGNOSIS — Z23 ENCOUNTER FOR IMMUNIZATION: ICD-10-CM

## 2024-10-18 DIAGNOSIS — Z00.00 ANNUAL PHYSICAL EXAM: ICD-10-CM

## 2024-10-18 DIAGNOSIS — E78.2 MIXED HYPERLIPIDEMIA: ICD-10-CM

## 2024-10-18 DIAGNOSIS — Z01.89 ENCOUNTER FOR ROUTINE LABORATORY TESTING: ICD-10-CM

## 2024-10-18 DIAGNOSIS — Z12.5 ENCOUNTER FOR PROSTATE CANCER SCREENING: ICD-10-CM

## 2024-10-18 DIAGNOSIS — K21.9 GASTROESOPHAGEAL REFLUX DISEASE WITHOUT ESOPHAGITIS: ICD-10-CM

## 2024-10-18 DIAGNOSIS — G43.909 MIGRAINE SYNDROME: ICD-10-CM

## 2024-10-18 DIAGNOSIS — I10 PRIMARY HYPERTENSION: ICD-10-CM

## 2024-10-18 DIAGNOSIS — N18.31 STAGE 3A CHRONIC KIDNEY DISEASE (MULTI): ICD-10-CM

## 2024-10-18 PROBLEM — L08.9 INFECTED EPITHELIAL INCLUSION CYST: Status: RESOLVED | Noted: 2024-09-17 | Resolved: 2024-10-18

## 2024-10-18 PROBLEM — L72.0 INFECTED EPITHELIAL INCLUSION CYST: Status: RESOLVED | Noted: 2024-09-17 | Resolved: 2024-10-18

## 2024-10-18 RX ORDER — CYCLOBENZAPRINE HCL 5 MG
5-10 TABLET ORAL 2 TIMES DAILY PRN
Qty: 60 TABLET | Refills: 1 | Status: SHIPPED | OUTPATIENT
Start: 2024-10-18

## 2024-10-18 ASSESSMENT — PATIENT HEALTH QUESTIONNAIRE - PHQ9
SUM OF ALL RESPONSES TO PHQ9 QUESTIONS 1 AND 2: 0
2. FEELING DOWN, DEPRESSED OR HOPELESS: NOT AT ALL
1. LITTLE INTEREST OR PLEASURE IN DOING THINGS: NOT AT ALL

## 2024-10-18 ASSESSMENT — PAIN SCALES - GENERAL: PAINLEVEL_OUTOF10: 0-NO PAIN

## 2024-10-18 NOTE — PROGRESS NOTES
[facility]: [facility]  PROGRESS NOTE      Kaiden Velasquez is a 64 y.o. male that is presenting today for Follow-up.    Assessment/Plan   - Patient has recently gotten his abscess lanced and was in the OR for removal of the cyst. Since that time, he feels well.  - Patient noting a muscular strain in his back. His son has given him some methocarbamol to use in the interim with significant relief. Discussed with him that I personally think that this is an extremely potent muscle relaxer and is very associated with falling. Will prescribe him one that is lower potency, counseled him on the side effects of this medication. Will also refer to PT.  - Blood pressure at goal today. Patient noting problems at home with their blood pressure; on review, I suspect that this is secondary to somewhat routine use to pseudoephedrine. Patient has been off of this now for a week with substantial improvement in blood pressure. Do not need to do anything else, discussed with the patient that he cannot take this as routinely as he had been.  - Otherwise, the patient feels well and denies any acute symptoms or concerns at this time.  - Encouraged continued dietary, exercise, and lifestyle modification.  - Significant medication and problem list reconciliation done today.   - Patient had labwork done for this appointment. Discussed today. Everything looked great.  - Will order labwork for the patient's next appointment. Encouraged the patient to get this labwork done one week prior to the next appointment.    Diagnoses and all orders for this visit:  Type 2 diabetes mellitus without complication, without long-term current use of insulin (Multi)  -     Hemoglobin A1C; Future  -     TSH with reflex to Free T4 if abnormal; Future  -     Albumin-Creatinine Ratio, Urine Random; Future  Gastroesophageal reflux disease without esophagitis  Stage 3a chronic kidney disease (Multi)  -     CBC and Auto Differential;  Future  -     Basic Metabolic Panel; Future  -     Albumin-Creatinine Ratio, Urine Random; Future  Factor V Leiden (Multi)  Mixed hyperlipidemia  -     Hepatic Function Panel; Future  -     Lipid Panel; Future  Primary hypertension  -     CBC and Auto Differential; Future  -     Basic Metabolic Panel; Future  Vitamin D deficiency  -     Vitamin D 25-Hydroxy,Total (for eval of Vitamin D levels); Future  Migraine syndrome  Back strain, sequela  -     cyclobenzaprine (Flexeril) 5 mg tablet; Take 1-2 tablets (5-10 mg) by mouth 2 times a day as needed for muscle spasms.  -     Referral to Physical Therapy; Future  Encounter for routine laboratory testing  Annual physical exam  -     Follow Up In Primary Care; Future  Encounter for immunization  -     Flu vaccine, trivalent, preservative free, HIGH-DOSE, age 65y+ (Fluzone)  Encounter for prostate cancer screening  -     Prostate Specific Antigen; Future    Current Outpatient Medications   Medication Instructions    cholecalciferol (VITAMIN D3) 50 mcg, oral, Daily    cyclobenzaprine (FLEXERIL) 5-10 mg, oral, 2 times daily PRN    fexofenadine (ALLEGRA) 180 mg, oral, Daily    propranolol XL (INNOPRAN XL) 120 mg, oral, Daily    rosuvastatin (CRESTOR) 5 mg, oral, Daily    SUMAtriptan (IMITREX) 50 mg, oral, 2 times daily PRN    warfarin (Jantoven) 5 mg tablet 1 tablet one day of the week, 0.5 tablet six days a week     Subjective   - The patient otherwise feels well and denies any acute symptoms or concerns at this time.  - The patient denies any changes or progression of their chronic medical problems.  - The patient denies any problems or concerns with their medications.      Review of Systems   Constitutional: Negative.    Respiratory: Negative.     Cardiovascular: Negative.    Gastrointestinal: Negative.    All other systems reviewed and are negative.     Objective   Vitals:    10/18/24 1521   BP: 132/80   Pulse: 69   SpO2: 97%      Body mass index is 30.13 kg/m².  Physical  "Exam  Vitals and nursing note reviewed.   Constitutional:       General: He is not in acute distress.  Neck:      Vascular: No carotid bruit.   Cardiovascular:      Rate and Rhythm: Normal rate and regular rhythm.      Heart sounds: Normal heart sounds.   Pulmonary:      Effort: Pulmonary effort is normal.      Breath sounds: Normal breath sounds.   Musculoskeletal:         General: No swelling.   Neurological:      Mental Status: He is alert. Mental status is at baseline.   Psychiatric:         Mood and Affect: Mood normal.       Diagnostic Results   Lab Results   Component Value Date    GLUCOSE 127 (H) 10/14/2024    CALCIUM 9.2 10/14/2024     10/14/2024    K 4.7 10/14/2024    CO2 31 10/14/2024     10/14/2024    BUN 24 (H) 10/14/2024    CREATININE 1.33 (H) 10/14/2024     Lab Results   Component Value Date    ALT 25 10/14/2024    AST 20 10/14/2024    ALKPHOS 52 10/14/2024    BILITOT 1.0 10/14/2024     Lab Results   Component Value Date    WBC 6.9 10/14/2024    HGB 16.5 10/14/2024    HCT 49.6 10/14/2024    MCV 91 10/14/2024     10/14/2024     Lab Results   Component Value Date    CHOL 139 04/06/2024    CHOL 134 10/07/2023    CHOL 189 08/26/2022     Lab Results   Component Value Date    HDL 42.0 04/06/2024    HDL 51.0 10/07/2023    HDL 44 08/26/2022     Lab Results   Component Value Date    LDLCALC 72 04/06/2024    LDLCALC 62 (L) 10/07/2023    LDLCALC 118 08/26/2022     Lab Results   Component Value Date    TRIG 123 04/06/2024    TRIG 105 10/07/2023    TRIG 135 08/26/2022     No components found for: \"CHOLHDL\"  Lab Results   Component Value Date    HGBA1C 5.9 (H) 10/14/2024     Other labs not included in the list above were reviewed either before or during this encounter.    History    History reviewed. No pertinent past medical history.  Past Surgical History:   Procedure Laterality Date    BACK SURGERY  2004    lumbar    NECK SURGERY  2014     Family History   Problem Relation Name Age of Onset    " Arthritis Mother      Gout Mother      Diabetes Mother      Heart disease Father      Hypertension Father      Prostate cancer Father      Breast cancer Sister      Cancer Brother      No Known Problems Maternal Grandfather      Colon cancer Other      Hypertension Other       Social History     Socioeconomic History    Marital status:      Spouse name: Not on file    Number of children: Not on file    Years of education: Not on file    Highest education level: Not on file   Occupational History    Not on file   Tobacco Use    Smoking status: Never    Smokeless tobacco: Never   Vaping Use    Vaping status: Never Used   Substance and Sexual Activity    Alcohol use: Yes     Comment: occasionally    Drug use: Never    Sexual activity: Not on file   Other Topics Concern    Not on file   Social History Narrative    Not on file     Social Drivers of Health     Financial Resource Strain: Not on file   Food Insecurity: Not on file   Transportation Needs: Not on file   Physical Activity: Not on file   Stress: Not on file   Social Connections: Not on file   Intimate Partner Violence: Not on file   Housing Stability: Not on file     No Known Allergies  Current Outpatient Medications on File Prior to Visit   Medication Sig Dispense Refill    cholecalciferol (Vitamin D3) 50 mcg (2,000 unit) capsule Take 1 capsule (50 mcg) by mouth once daily.      fexofenadine (Allegra) 180 mg tablet Take 1 tablet (180 mg) by mouth once daily. 90 tablet 3    propranolol XL (Innopran XL) 120 mg 24 hr capsule Take 1 capsule (120 mg) by mouth once daily. 90 capsule 3    rosuvastatin (Crestor) 5 mg tablet Take 1 tablet (5 mg) by mouth once daily. 90 tablet 3    SUMAtriptan (Imitrex) 50 mg tablet Take 1 tablet (50 mg) by mouth 2 times a day as needed for migraine. 9 tablet 3    warfarin (Jantoven) 5 mg tablet 1 tablet one day of the week, 0.5 tablet six days a week 90 tablet 3    [DISCONTINUED] pseudoephedrine (Sudafed) 30 mg tablet Take 2  tablets (60 mg) by mouth every 6 hours if needed for congestion. 30 tablet      No current facility-administered medications on file prior to visit.     Immunization History   Administered Date(s) Administered    Flu vaccine (IIV4), preservative free *Check age/dose* 10/21/2020, 11/10/2022    Flu vaccine, quadrivalent, high-dose, preservative free, age 65y+ (FLUZONE) 10/13/2023    Flu vaccine, quadrivalent, recombinant, preservative free, adult (FLUBLOK) 10/25/2021    Influenza, injectable, quadrivalent 10/06/2015    Influenza, seasonal, injectable 11/29/2012    Pfizer Purple Cap SARS-CoV-2 03/31/2021, 04/24/2021    Pneumococcal conjugate vaccine, 20-valent (PREVNAR 20) 04/03/2023     Patient's medical history was reviewed and updated either before or during this encounter.       Florin Worley MD

## 2024-10-18 NOTE — PATIENT INSTRUCTIONS
- Patient has recently gotten his abscess lanced and was in the OR for removal of the cyst. Since that time, he feels well.  - Patient noting a muscular strain in his back. His son has given him some methocarbamol to use in the interim with significant relief. Discussed with him that I personally think that this is an extremely potent muscle relaxer and is very associated with falling. Will prescribe him one that is lower potency, counseled him on the side effects of this medication. Will also refer to PT.  - Blood pressure at goal today. Patient noting problems at home with their blood pressure; on review, I suspect that this is secondary to somewhat routine use to pseudoephedrine. Patient has been off of this now for a week with substantial improvement in blood pressure. Do not need to do anything else, discussed with the patient that he cannot take this as routinely as he had been.  - Otherwise, the patient feels well and denies any acute symptoms or concerns at this time.  - Encouraged continued dietary, exercise, and lifestyle modification.  - Significant medication and problem list reconciliation done today.   - Patient had labwork done for this appointment. Discussed today. Everything looked great.  - Will order labwork for the patient's next appointment. Encouraged the patient to get this labwork done one week prior to the next appointment.

## 2024-10-24 ENCOUNTER — OFFICE VISIT (OUTPATIENT)
Dept: SURGERY | Facility: CLINIC | Age: 64
End: 2024-10-24
Payer: COMMERCIAL

## 2024-10-24 VITALS
RESPIRATION RATE: 17 BRPM | BODY MASS INDEX: 30.06 KG/M2 | SYSTOLIC BLOOD PRESSURE: 171 MMHG | HEIGHT: 70 IN | WEIGHT: 210 LBS | OXYGEN SATURATION: 100 % | TEMPERATURE: 97.2 F | DIASTOLIC BLOOD PRESSURE: 106 MMHG | HEART RATE: 54 BPM

## 2024-10-24 DIAGNOSIS — L72.0 INCLUSION CYST: Primary | ICD-10-CM

## 2024-10-24 PROCEDURE — 99213 OFFICE O/P EST LOW 20 MIN: CPT | Performed by: SURGERY

## 2024-10-24 PROCEDURE — 1126F AMNT PAIN NOTED NONE PRSNT: CPT | Performed by: SURGERY

## 2024-10-24 PROCEDURE — 3008F BODY MASS INDEX DOCD: CPT | Performed by: SURGERY

## 2024-10-24 PROCEDURE — 3080F DIAST BP >= 90 MM HG: CPT | Performed by: SURGERY

## 2024-10-24 PROCEDURE — 3077F SYST BP >= 140 MM HG: CPT | Performed by: SURGERY

## 2024-10-24 PROCEDURE — 3044F HG A1C LEVEL LT 7.0%: CPT | Performed by: SURGERY

## 2024-10-24 PROCEDURE — 1036F TOBACCO NON-USER: CPT | Performed by: SURGERY

## 2024-10-24 PROCEDURE — 3048F LDL-C <100 MG/DL: CPT | Performed by: SURGERY

## 2024-10-24 PROCEDURE — 3061F NEG MICROALBUMINURIA REV: CPT | Performed by: SURGERY

## 2024-10-24 PROCEDURE — 1159F MED LIST DOCD IN RCRD: CPT | Performed by: SURGERY

## 2024-10-24 ASSESSMENT — LIFESTYLE VARIABLES
HOW OFTEN DO YOU HAVE A DRINK CONTAINING ALCOHOL: 2-4 TIMES A MONTH
HOW MANY STANDARD DRINKS CONTAINING ALCOHOL DO YOU HAVE ON A TYPICAL DAY: 1 OR 2
SKIP TO QUESTIONS 9-10: 1
HOW OFTEN DO YOU HAVE SIX OR MORE DRINKS ON ONE OCCASION: NEVER
AUDIT-C TOTAL SCORE: 2

## 2024-10-24 ASSESSMENT — ENCOUNTER SYMPTOMS
DEPRESSION: 0
OCCASIONAL FEELINGS OF UNSTEADINESS: 0
LOSS OF SENSATION IN FEET: 0

## 2024-10-24 ASSESSMENT — PATIENT HEALTH QUESTIONNAIRE - PHQ9
1. LITTLE INTEREST OR PLEASURE IN DOING THINGS: NOT AT ALL
2. FEELING DOWN, DEPRESSED OR HOPELESS: NOT AT ALL
SUM OF ALL RESPONSES TO PHQ9 QUESTIONS 1 & 2: 0

## 2024-10-24 ASSESSMENT — COLUMBIA-SUICIDE SEVERITY RATING SCALE - C-SSRS
6. HAVE YOU EVER DONE ANYTHING, STARTED TO DO ANYTHING, OR PREPARED TO DO ANYTHING TO END YOUR LIFE?: NO
2. HAVE YOU ACTUALLY HAD ANY THOUGHTS OF KILLING YOURSELF?: NO
1. IN THE PAST MONTH, HAVE YOU WISHED YOU WERE DEAD OR WISHED YOU COULD GO TO SLEEP AND NOT WAKE UP?: NO

## 2024-10-24 ASSESSMENT — PAIN SCALES - GENERAL: PAINLEVEL_OUTOF10: 0-NO PAIN

## 2024-10-24 NOTE — PROGRESS NOTES
"Subjective   Patient ID: Kaiden Velasquez is a 64 y.o. male who presents for post -op.  HPI  Patient returns to clinic for S/P Excision of Cyst to Posterior Neck 10/11/2024.  Patient last seen in clinic on 10/11/2024 for follow up infected epithelial inclusion cyst.   FINAL DIAGNOSIS      A.  SKIN AND SOFT TISSUE, RIGHT POSTERIOR NECK MASS, EXCISION:  --Skin with ruptured and inflamed epidermal inclusion cyst.  --Solar elastosis.       Social History:  Tobacco Use - no  Do you use any recreational drugs -no   Alcohol Use -ocassional  Caffeine Intake - pop   Working - fulltime  Marital status -   Living with - wife    History reviewed. No pertinent past medical history.    Past Surgical History:   Procedure Laterality Date    BACK SURGERY  2004    lumbar    NECK SURGERY  2014    SOFT TISSUE CYST EXCISION  10/11/2024     Family History   Problem Relation Name Age of Onset    Arthritis Mother      Gout Mother      Diabetes Mother      Heart disease Father      Hypertension Father      Prostate cancer Father      Breast cancer Sister      Cancer Brother      No Known Problems Maternal Grandfather      Colon cancer Other      Hypertension Other       No Known Allergies    Review of Systems  BP (!) 171/106   Pulse 54   Temp 36.2 °C (97.2 °F)   Resp 17   Ht 1.778 m (5' 10\")   Wt 95.3 kg (210 lb)   SpO2 100%   BMI 30.13 kg/m²     Objective   Physical Exam  Incision clean dry and intact  Assessment/Plan   Problem List Items Addressed This Visit             ICD-10-CM    Inclusion cyst - Primary L72.0     Sp excision with good healing.                  Jodie Dennis MD 10/24/24 11:56 AM   "

## 2025-02-03 DIAGNOSIS — I10 PRIMARY HYPERTENSION: ICD-10-CM

## 2025-02-04 ENCOUNTER — TELEPHONE (OUTPATIENT)
Dept: PRIMARY CARE | Facility: CLINIC | Age: 65
End: 2025-02-04
Payer: COMMERCIAL

## 2025-02-04 DIAGNOSIS — I10 PRIMARY HYPERTENSION: ICD-10-CM

## 2025-02-07 DIAGNOSIS — I10 PRIMARY HYPERTENSION: ICD-10-CM

## 2025-02-07 RX ORDER — PROPRANOLOL HYDROCHLORIDE 120 MG/1
120 CAPSULE, EXTENDED RELEASE ORAL DAILY
COMMUNITY
Start: 2024-07-26 | End: 2025-02-07

## 2025-02-07 NOTE — TELEPHONE ENCOUNTER
Refill request 30 day rx propanol to [facility] due to mail order delay.   Detail Level: Simple Additional Notes: Patient consent was obtained to proceed with the visit and recommended plan of care after discussion of all risks and benefits, including the risks of COVID-19 exposure. Render Risk Assessment In Note?: no Additional Notes: Retaining suture removed at today’s visit

## 2025-02-10 RX ORDER — PROPRANOLOL HYDROCHLORIDE 120 MG/1
120 CAPSULE, EXTENDED RELEASE ORAL DAILY
Qty: 30 CAPSULE | Refills: 0 | Status: SHIPPED | OUTPATIENT
Start: 2025-02-10 | End: 2025-02-14 | Stop reason: SDUPTHER

## 2025-02-14 DIAGNOSIS — I10 PRIMARY HYPERTENSION: ICD-10-CM

## 2025-02-14 RX ORDER — PROPRANOLOL HYDROCHLORIDE 120 MG/1
120 CAPSULE, EXTENDED RELEASE ORAL DAILY
Qty: 30 CAPSULE | Refills: 3 | Status: SHIPPED | OUTPATIENT
Start: 2025-02-14

## 2025-04-28 ENCOUNTER — OFFICE VISIT (OUTPATIENT)
Dept: PRIMARY CARE | Facility: CLINIC | Age: 65
End: 2025-04-28
Payer: COMMERCIAL

## 2025-04-28 VITALS
WEIGHT: 215 LBS | BODY MASS INDEX: 30.78 KG/M2 | HEIGHT: 70 IN | SYSTOLIC BLOOD PRESSURE: 130 MMHG | DIASTOLIC BLOOD PRESSURE: 80 MMHG | OXYGEN SATURATION: 99 % | HEART RATE: 63 BPM | TEMPERATURE: 97 F

## 2025-04-28 DIAGNOSIS — E78.2 MIXED HYPERLIPIDEMIA: ICD-10-CM

## 2025-04-28 DIAGNOSIS — N18.31 STAGE 3A CHRONIC KIDNEY DISEASE (MULTI): ICD-10-CM

## 2025-04-28 DIAGNOSIS — Z71.89 COUNSELING REGARDING ADVANCED CARE PLANNING AND GOALS OF CARE: ICD-10-CM

## 2025-04-28 DIAGNOSIS — D68.51 FACTOR V LEIDEN (MULTI): ICD-10-CM

## 2025-04-28 DIAGNOSIS — E55.9 VITAMIN D DEFICIENCY: ICD-10-CM

## 2025-04-28 DIAGNOSIS — Z12.11 ENCOUNTER FOR COLORECTAL CANCER SCREENING: ICD-10-CM

## 2025-04-28 DIAGNOSIS — I10 PRIMARY HYPERTENSION: ICD-10-CM

## 2025-04-28 DIAGNOSIS — Z12.5 ENCOUNTER FOR PROSTATE CANCER SCREENING: ICD-10-CM

## 2025-04-28 DIAGNOSIS — Z01.89 ENCOUNTER FOR ROUTINE LABORATORY TESTING: ICD-10-CM

## 2025-04-28 DIAGNOSIS — Z23 ENCOUNTER FOR IMMUNIZATION: ICD-10-CM

## 2025-04-28 DIAGNOSIS — K21.9 GASTROESOPHAGEAL REFLUX DISEASE WITHOUT ESOPHAGITIS: ICD-10-CM

## 2025-04-28 DIAGNOSIS — Z12.12 ENCOUNTER FOR COLORECTAL CANCER SCREENING: ICD-10-CM

## 2025-04-28 DIAGNOSIS — Z00.00 ANNUAL PHYSICAL EXAM: Primary | ICD-10-CM

## 2025-04-28 DIAGNOSIS — E11.9 TYPE 2 DIABETES MELLITUS WITHOUT COMPLICATION, WITHOUT LONG-TERM CURRENT USE OF INSULIN: ICD-10-CM

## 2025-04-28 DIAGNOSIS — G43.909 MIGRAINE SYNDROME: ICD-10-CM

## 2025-04-28 PROCEDURE — 3075F SYST BP GE 130 - 139MM HG: CPT | Performed by: STUDENT IN AN ORGANIZED HEALTH CARE EDUCATION/TRAINING PROGRAM

## 2025-04-28 PROCEDURE — 1126F AMNT PAIN NOTED NONE PRSNT: CPT | Performed by: STUDENT IN AN ORGANIZED HEALTH CARE EDUCATION/TRAINING PROGRAM

## 2025-04-28 PROCEDURE — 99397 PER PM REEVAL EST PAT 65+ YR: CPT | Performed by: STUDENT IN AN ORGANIZED HEALTH CARE EDUCATION/TRAINING PROGRAM

## 2025-04-28 PROCEDURE — 1170F FXNL STATUS ASSESSED: CPT | Performed by: STUDENT IN AN ORGANIZED HEALTH CARE EDUCATION/TRAINING PROGRAM

## 2025-04-28 PROCEDURE — 3008F BODY MASS INDEX DOCD: CPT | Performed by: STUDENT IN AN ORGANIZED HEALTH CARE EDUCATION/TRAINING PROGRAM

## 2025-04-28 PROCEDURE — 3079F DIAST BP 80-89 MM HG: CPT | Performed by: STUDENT IN AN ORGANIZED HEALTH CARE EDUCATION/TRAINING PROGRAM

## 2025-04-28 PROCEDURE — 1159F MED LIST DOCD IN RCRD: CPT | Performed by: STUDENT IN AN ORGANIZED HEALTH CARE EDUCATION/TRAINING PROGRAM

## 2025-04-28 PROCEDURE — 1158F ADVNC CARE PLAN TLK DOCD: CPT | Performed by: STUDENT IN AN ORGANIZED HEALTH CARE EDUCATION/TRAINING PROGRAM

## 2025-04-28 PROCEDURE — 1160F RVW MEDS BY RX/DR IN RCRD: CPT | Performed by: STUDENT IN AN ORGANIZED HEALTH CARE EDUCATION/TRAINING PROGRAM

## 2025-04-28 PROCEDURE — 1123F ACP DISCUSS/DSCN MKR DOCD: CPT | Performed by: STUDENT IN AN ORGANIZED HEALTH CARE EDUCATION/TRAINING PROGRAM

## 2025-04-28 RX ORDER — PROPRANOLOL HYDROCHLORIDE 120 MG/1
120 CAPSULE, EXTENDED RELEASE ORAL DAILY
Qty: 90 CAPSULE | Refills: 3 | Status: SHIPPED | OUTPATIENT
Start: 2025-04-28 | End: 2026-04-28

## 2025-04-28 ASSESSMENT — ACTIVITIES OF DAILY LIVING (ADL)
DOING_HOUSEWORK: INDEPENDENT
MANAGING_FINANCES: INDEPENDENT
TAKING_MEDICATION: INDEPENDENT
BATHING: INDEPENDENT
GROCERY_SHOPPING: INDEPENDENT
DRESSING: INDEPENDENT

## 2025-04-28 ASSESSMENT — ENCOUNTER SYMPTOMS
PSYCHIATRIC NEGATIVE: 1
MUSCULOSKELETAL NEGATIVE: 1
RESPIRATORY NEGATIVE: 1
CARDIOVASCULAR NEGATIVE: 1
ENDOCRINE NEGATIVE: 1
NEUROLOGICAL NEGATIVE: 1
CONSTITUTIONAL NEGATIVE: 1
GASTROINTESTINAL NEGATIVE: 1
HEMATOLOGIC/LYMPHATIC NEGATIVE: 1
ALLERGIC/IMMUNOLOGIC NEGATIVE: 1
EYES NEGATIVE: 1

## 2025-04-28 ASSESSMENT — PAIN SCALES - GENERAL: PAINLEVEL_OUTOF10: 0-NO PAIN

## 2025-04-28 NOTE — PROGRESS NOTES
[facility]: [facility]  MEDICARE WELLNESS EXAM      Kaiden Velasquez is a 65 y.o. male that is presenting today for Annual Exam.    Assessment/Plan    - Overall, the patient feels well and denies any acute symptoms / concerns at this time.  - Blood pressure at goal today.  - Encouraged continued dietary, exercise, and lifestyle modification.  - Significant medication and problem list reconciliation done today.     Discussed routine and/or preventative care with the patient as outlined below:  - Labwork:   - Patient had labwork done for this appointment. Discussed today.   - Will order labwork for the patient's next appointment. Encouraged the patient to get this labwork done one week prior to the next appointment.  - Imaging:   - Colorectal Cancer: Patient is due. Encouraged routine screening.  - Encouraged the patient to continue to get their routine annual diabetic retinal exam.  - Immunizations:   - Encouraged the patient to get their shingles (shingrix) immunizations.  - Discussed the tetanus (TDAP) booster.     ADVANCED CARE PLANNING  Advanced Care Planning was discussed with patient.  Encouraged the patient to confirm that Living Will and Healthcare Power of  (HCPoA) are accurate and up to date.  Encouraged the patient to confirm that our office be provided a copy of any documentation in the event that anything changes.    Diagnoses and all orders for this visit:  Annual physical exam  -     Follow Up In Primary Care  Counseling regarding advanced care planning and goals of care  Encounter for colorectal cancer screening  -     Referral to Gastroenterology; Future  Encounter for routine laboratory testing  Encounter for prostate cancer screening  -     Prostate Specific Antigen; Future  Encounter for immunization  Type 2 diabetes mellitus without complication, without long-term current use of insulin  -     Follow Up In Primary Care; Future  -     Hemoglobin A1C;  Future  Gastroesophageal reflux disease without esophagitis  -     Follow Up In Primary Care; Future  Stage 3a chronic kidney disease (Multi)  -     Follow Up In Primary Care; Future  -     Comprehensive Metabolic Panel; Future  -     CBC and Auto Differential; Future  Factor V Leiden (Multi)  -     Follow Up In Primary Care; Future  -     Protime-INR; Standing  Mixed hyperlipidemia  -     Follow Up In Primary Care; Future  Primary hypertension  -     Follow Up In Primary Care; Future  -     Comprehensive Metabolic Panel; Future  -     CBC and Auto Differential; Future  -     propranolol LA (Inderal LA) 120 mg 24 hr capsule; Take 1 capsule (120 mg) by mouth once daily.  Vitamin D deficiency  -     Follow Up In Primary Care; Future  Migraine syndrome  -     Follow Up In Primary Care; Future    Current Outpatient Medications   Medication Instructions    cholecalciferol (VITAMIN D3) 50 mcg, oral, Daily    cyclobenzaprine (FLEXERIL) 5-10 mg, oral, 2 times daily PRN    fexofenadine (ALLEGRA) 180 mg, oral, Daily    propranolol LA (INDERAL LA) 120 mg, oral, Daily    rosuvastatin (CRESTOR) 5 mg, oral, Daily    SUMAtriptan (IMITREX) 50 mg, oral, 2 times daily PRN    warfarin (Jantoven) 5 mg tablet 1 tablet one day of the week, 0.5 tablet six days a week     Subjective   - The patient otherwise feels well and denies any acute symptoms or concerns at this time.  - The patient denies any changes or progression of their chronic medical problems.  - The patient denies any problems or concerns with their medications.      Review of Systems   Constitutional: Negative.    HENT: Negative.     Eyes: Negative.    Respiratory: Negative.     Cardiovascular: Negative.    Gastrointestinal: Negative.    Endocrine: Negative.    Genitourinary: Negative.    Musculoskeletal: Negative.    Skin: Negative.    Allergic/Immunologic: Negative.    Neurological: Negative.    Hematological: Negative.    Psychiatric/Behavioral: Negative.     All other  systems reviewed and are negative.    Objective   Vitals:    04/28/25 1552   BP: 130/80   Pulse: 63   Temp: 36.1 °C (97 °F)   SpO2: 99%      Body mass index is 30.85 kg/m².  Physical Exam  Vitals and nursing note reviewed.   Constitutional:       General: He is not in acute distress.     Appearance: Normal appearance. He is not ill-appearing.   HENT:      Head: Normocephalic and atraumatic.      Right Ear: Tympanic membrane, ear canal and external ear normal. There is no impacted cerumen.      Left Ear: Tympanic membrane, ear canal and external ear normal. There is no impacted cerumen.      Nose: Nose normal.      Mouth/Throat:      Mouth: Mucous membranes are moist.      Pharynx: Oropharynx is clear. No oropharyngeal exudate or posterior oropharyngeal erythema.   Eyes:      General: No scleral icterus.        Right eye: No discharge.         Left eye: No discharge.      Extraocular Movements: Extraocular movements intact.      Conjunctiva/sclera: Conjunctivae normal.      Pupils: Pupils are equal, round, and reactive to light.   Neck:      Vascular: No carotid bruit.   Cardiovascular:      Rate and Rhythm: Normal rate and regular rhythm.      Pulses: Normal pulses.      Heart sounds: Normal heart sounds. No murmur heard.     No friction rub. No gallop.   Pulmonary:      Effort: Pulmonary effort is normal. No respiratory distress.      Breath sounds: Normal breath sounds.   Abdominal:      General: Abdomen is flat. Bowel sounds are normal.      Palpations: Abdomen is soft.      Tenderness: There is no abdominal tenderness.      Hernia: No hernia is present.   Musculoskeletal:         General: No swelling. Normal range of motion.      Cervical back: Normal range of motion.   Lymphadenopathy:      Cervical: No cervical adenopathy.   Skin:     General: Skin is warm and dry.      Coloration: Skin is not jaundiced.      Findings: No rash.   Neurological:      General: No focal deficit present.      Mental Status: He is  "alert and oriented to person, place, and time. Mental status is at baseline.   Psychiatric:         Mood and Affect: Mood normal.         Behavior: Behavior normal.       Diagnostic Results   Lab Results   Component Value Date    GLUCOSE 119 (H) 04/26/2025    CALCIUM 9.0 04/26/2025     04/26/2025    K 4.5 04/26/2025    CO2 30 04/26/2025     04/26/2025    BUN 25 04/26/2025    CREATININE 1.24 04/26/2025     Lab Results   Component Value Date    ALT 26 04/26/2025    AST 19 04/26/2025    ALKPHOS 51 04/26/2025    BILITOT 1.1 04/26/2025     Lab Results   Component Value Date    WBC 6.9 04/26/2025    HGB 16.8 04/26/2025    HCT 53.1 (H) 04/26/2025    MCV 95.0 04/26/2025     04/26/2025     Lab Results   Component Value Date    CHOL 139 04/06/2024    CHOL 134 10/07/2023    CHOL 189 08/26/2022     Lab Results   Component Value Date    HDL 42.0 04/06/2024    HDL 51.0 10/07/2023    HDL 44 08/26/2022     Lab Results   Component Value Date    LDLCALC 72 04/06/2024    LDLCALC 62 (L) 10/07/2023    LDLCALC 118 08/26/2022     Lab Results   Component Value Date    TRIG 123 04/06/2024    TRIG 105 10/07/2023    TRIG 135 08/26/2022     No components found for: \"CHOLHDL\"  Lab Results   Component Value Date    HGBA1C 5.9 (H) 10/14/2024     Other labs not included in the list above reviewed either before or during this encounter.    History   Medical History[1]  Surgical History[2]  Family History[3]  Social History     Socioeconomic History    Marital status:      Spouse name: Not on file    Number of children: Not on file    Years of education: Not on file    Highest education level: Not on file   Occupational History    Not on file   Tobacco Use    Smoking status: Never    Smokeless tobacco: Never   Vaping Use    Vaping status: Never Used   Substance and Sexual Activity    Alcohol use: Yes     Comment: occasionally    Drug use: Never    Sexual activity: Defer   Other Topics Concern    Not on file   Social History " Narrative    Not on file     Social Drivers of Health     Financial Resource Strain: Not on file   Food Insecurity: Not on file   Transportation Needs: Not on file   Physical Activity: Not on file   Stress: Not on file   Social Connections: Not on file   Intimate Partner Violence: Not on file   Housing Stability: Not on file     Allergies[4]  Medications Ordered Prior to Encounter[5]  Immunization History   Administered Date(s) Administered    COVID-19, mRNA, LNP-S, PF, 30 mcg/0.3 mL dose 03/31/2021, 04/24/2021    Flu vaccine (IIV4), preservative free *Check age/dose* 10/21/2020, 11/10/2022    Flu vaccine, quadrivalent, high-dose, preservative free, age 65y+ (FLUZONE) 10/13/2023    Flu vaccine, quadrivalent, recombinant, preservative free, adult (FLUBLOK) 10/25/2021    Flu vaccine, trivalent, preservative free, HIGH-DOSE, age 65y+ (Fluzone) 10/18/2024    Influenza, injectable, quadrivalent 10/06/2015    Influenza, seasonal, injectable 11/29/2012    Pfizer Purple Cap SARS-CoV-2 04/03/2021    Pneumococcal conjugate vaccine, 20-valent (PREVNAR 20) 04/03/2023     Patient's medical history was reviewed and updated either before or during this encounter.     Florin Worley MD         [1] History reviewed. No pertinent past medical history.  [2]   Past Surgical History:  Procedure Laterality Date    BACK SURGERY  2004    lumbar    NECK SURGERY  2014    SOFT TISSUE CYST EXCISION  10/11/2024   [3]   Family History  Problem Relation Name Age of Onset    Arthritis Mother      Gout Mother      Diabetes Mother      Heart disease Father      Hypertension Father      Prostate cancer Father      Breast cancer Sister      Cancer Brother      No Known Problems Maternal Grandfather      Colon cancer Other      Hypertension Other     [4] No Known Allergies  [5]   Current Outpatient Medications on File Prior to Visit   Medication Sig Dispense Refill    cholecalciferol (Vitamin D3) 50 mcg (2,000 unit) capsule Take 1 capsule (50 mcg)  by mouth once daily.      cyclobenzaprine (Flexeril) 5 mg tablet Take 1-2 tablets (5-10 mg) by mouth 2 times a day as needed for muscle spasms. 60 tablet 1    fexofenadine (Allegra) 180 mg tablet Take 1 tablet (180 mg) by mouth once daily. 90 tablet 3    rosuvastatin (Crestor) 5 mg tablet Take 1 tablet (5 mg) by mouth once daily. 90 tablet 3    SUMAtriptan (Imitrex) 50 mg tablet Take 1 tablet (50 mg) by mouth 2 times a day as needed for migraine. 9 tablet 3    warfarin (Jantoven) 5 mg tablet 1 tablet one day of the week, 0.5 tablet six days a week 90 tablet 3    [DISCONTINUED] propranolol LA (Inderal LA) 120 mg 24 hr capsule Take 1 capsule (120 mg) by mouth once daily. 30 capsule 3    [DISCONTINUED] propranolol XL (Innopran XL) 120 mg 24 hr capsule Take 1 capsule (120 mg) by mouth once daily. (Patient not taking: Reported on 4/28/2025) 30 capsule 0     No current facility-administered medications on file prior to visit.

## 2025-04-28 NOTE — PATIENT INSTRUCTIONS
- Overall, the patient feels well and denies any acute symptoms / concerns at this time.  - Blood pressure at goal today.  - Encouraged continued dietary, exercise, and lifestyle modification.  - Significant medication and problem list reconciliation done today.     Discussed routine and/or preventative care with the patient as outlined below:  - Labwork:   - Patient had labwork done for this appointment. Discussed today.   - Will order labwork for the patient's next appointment. Encouraged the patient to get this labwork done one week prior to the next appointment.  - Imaging:   - Colorectal Cancer: Patient is due. Encouraged routine screening.  - Encouraged the patient to continue to get their routine annual diabetic retinal exam.  - Immunizations:   - Encouraged the patient to get their shingles (shingrix) immunizations.  - Discussed the tetanus (TDAP) booster.     ADVANCED CARE PLANNING  Advanced Care Planning was discussed with patient.  Encouraged the patient to confirm that Living Will and Healthcare Power of  (HCPoA) are accurate and up to date.  Encouraged the patient to confirm that our office be provided a copy of any documentation in the event that anything changes.

## 2025-04-29 LAB
25(OH)D3+25(OH)D2 SERPL-MCNC: 40 NG/ML (ref 30–100)
ALBUMIN SERPL-MCNC: 4.1 G/DL (ref 3.6–5.1)
ALBUMIN/CREAT UR: 7 MG/G CREAT
ALBUMIN/GLOB SERPL: 1.6 (CALC) (ref 1–2.5)
ALP SERPL-CCNC: 51 U/L (ref 35–144)
ALT SERPL-CCNC: 26 U/L (ref 9–46)
ANION GAP SERPL CALCULATED.4IONS-SCNC: 9 MMOL/L (CALC) (ref 7–17)
AST SERPL-CCNC: 19 U/L (ref 10–35)
BASOPHILS # BLD AUTO: 138 CELLS/UL (ref 0–200)
BASOPHILS NFR BLD AUTO: 2 %
BILIRUB DIRECT SERPL-MCNC: 0.2 MG/DL
BILIRUB INDIRECT SERPL-MCNC: 0.9 MG/DL (CALC) (ref 0.2–1.2)
BILIRUB SERPL-MCNC: 1.1 MG/DL (ref 0.2–1.2)
BUN SERPL-MCNC: 25 MG/DL (ref 7–25)
BUN/CREAT SERPL: ABNORMAL (CALC) (ref 6–22)
CALCIUM SERPL-MCNC: 9 MG/DL (ref 8.6–10.3)
CHLORIDE SERPL-SCNC: 105 MMOL/L (ref 98–110)
CHOLEST SERPL-MCNC: 147 MG/DL
CHOLEST/HDLC SERPL: 2.9 (CALC)
CO2 SERPL-SCNC: 30 MMOL/L (ref 20–32)
CREAT SERPL-MCNC: 1.24 MG/DL (ref 0.7–1.35)
CREAT UR-MCNC: 152 MG/DL (ref 20–320)
EGFRCR SERPLBLD CKD-EPI 2021: 64 ML/MIN/1.73M2
EOSINOPHIL # BLD AUTO: 173 CELLS/UL (ref 15–500)
EOSINOPHIL NFR BLD AUTO: 2.5 %
ERYTHROCYTE [DISTWIDTH] IN BLOOD BY AUTOMATED COUNT: 14.7 % (ref 11–15)
EST. AVERAGE GLUCOSE BLD GHB EST-MCNC: 143 MG/DL
EST. AVERAGE GLUCOSE BLD GHB EST-SCNC: 7.9 MMOL/L
GLOBULIN SER CALC-MCNC: 2.6 G/DL (CALC) (ref 1.9–3.7)
GLUCOSE SERPL-MCNC: 119 MG/DL (ref 65–99)
HBA1C MFR BLD: 6.6 %
HCT VFR BLD AUTO: 53.1 % (ref 38.5–50)
HDLC SERPL-MCNC: 51 MG/DL
HGB BLD-MCNC: 16.8 G/DL (ref 13.2–17.1)
LDLC SERPL CALC-MCNC: 77 MG/DL (CALC)
LYMPHOCYTES # BLD AUTO: 1456 CELLS/UL (ref 850–3900)
LYMPHOCYTES NFR BLD AUTO: 21.1 %
MCH RBC QN AUTO: 30.1 PG (ref 27–33)
MCHC RBC AUTO-ENTMCNC: 31.6 G/DL (ref 32–36)
MCV RBC AUTO: 95 FL (ref 80–100)
MICROALBUMIN UR-MCNC: 1.1 MG/DL
MONOCYTES # BLD AUTO: 380 CELLS/UL (ref 200–950)
MONOCYTES NFR BLD AUTO: 5.5 %
NEUTROPHILS # BLD AUTO: 4754 CELLS/UL (ref 1500–7800)
NEUTROPHILS NFR BLD AUTO: 68.9 %
NONHDLC SERPL-MCNC: 96 MG/DL (CALC)
PLATELET # BLD AUTO: 181 THOUSAND/UL (ref 140–400)
PMV BLD REES-ECKER: 10.9 FL (ref 7.5–12.5)
POTASSIUM SERPL-SCNC: 4.5 MMOL/L (ref 3.5–5.3)
PROT SERPL-MCNC: 6.7 G/DL (ref 6.1–8.1)
PSA SERPL-MCNC: 1.68 NG/ML
RBC # BLD AUTO: 5.59 MILLION/UL (ref 4.2–5.8)
SODIUM SERPL-SCNC: 144 MMOL/L (ref 135–146)
TRIGL SERPL-MCNC: 107 MG/DL
TSH SERPL-ACNC: 1.94 MIU/L (ref 0.4–4.5)
WBC # BLD AUTO: 6.9 THOUSAND/UL (ref 3.8–10.8)

## 2025-05-02 DIAGNOSIS — D68.51 FACTOR V LEIDEN (MULTI): ICD-10-CM

## 2025-05-02 RX ORDER — WARFARIN SODIUM 5 MG/1
TABLET ORAL
Qty: 90 TABLET | Refills: 0 | Status: SHIPPED | OUTPATIENT
Start: 2025-05-02

## 2025-05-05 DIAGNOSIS — E78.2 MIXED HYPERLIPIDEMIA: ICD-10-CM

## 2025-05-05 DIAGNOSIS — G43.909 MIGRAINE SYNDROME: ICD-10-CM

## 2025-05-05 RX ORDER — ROSUVASTATIN CALCIUM 5 MG/1
5 TABLET, COATED ORAL DAILY
Qty: 90 TABLET | Refills: 3 | Status: SHIPPED | OUTPATIENT
Start: 2025-05-05 | End: 2026-05-05

## 2025-05-05 RX ORDER — SUMATRIPTAN SUCCINATE 50 MG/1
50 TABLET ORAL 2 TIMES DAILY PRN
Qty: 9 TABLET | Refills: 3 | Status: SHIPPED | OUTPATIENT
Start: 2025-05-05

## 2025-05-18 LAB
INR PPP: 2.1
PROTHROMBIN TIME: 21.4 SEC (ref 9–11.5)

## 2025-05-23 DIAGNOSIS — D68.51 FACTOR V LEIDEN (MULTI): ICD-10-CM

## 2025-06-20 DIAGNOSIS — D68.51 FACTOR V LEIDEN (MULTI): ICD-10-CM

## 2025-07-18 DIAGNOSIS — D68.51 FACTOR V LEIDEN (MULTI): ICD-10-CM

## 2025-08-15 DIAGNOSIS — D68.51 FACTOR V LEIDEN (MULTI): ICD-10-CM
